# Patient Record
Sex: FEMALE | Race: WHITE | NOT HISPANIC OR LATINO | Employment: OTHER | URBAN - METROPOLITAN AREA
[De-identification: names, ages, dates, MRNs, and addresses within clinical notes are randomized per-mention and may not be internally consistent; named-entity substitution may affect disease eponyms.]

---

## 2019-06-05 ENCOUNTER — THERAPY VISIT (OUTPATIENT)
Dept: PHYSICAL THERAPY | Facility: CLINIC | Age: 74
End: 2019-06-05
Payer: COMMERCIAL

## 2019-06-05 DIAGNOSIS — M25.562 ACUTE PAIN OF LEFT KNEE: ICD-10-CM

## 2019-06-05 PROCEDURE — 97161 PT EVAL LOW COMPLEX 20 MIN: CPT | Mod: GP | Performed by: PHYSICAL THERAPIST

## 2019-06-05 PROCEDURE — 97110 THERAPEUTIC EXERCISES: CPT | Mod: GP | Performed by: PHYSICAL THERAPIST

## 2019-06-05 ASSESSMENT — ACTIVITIES OF DAILY LIVING (ADL)
SWELLING: THE SYMPTOM AFFECTS MY ACTIVITY MODERATELY
RISE FROM A CHAIR: ACTIVITY IS SOMEWHAT DIFFICULT
LIMPING: THE SYMPTOM AFFECTS MY ACTIVITY MODERATELY
KNEE_ACTIVITY_OF_DAILY_LIVING_SCORE: 38.57
GO UP STAIRS: ACTIVITY IS VERY DIFFICULT
SIT WITH YOUR KNEE BENT: ACTIVITY IS SOMEWHAT DIFFICULT
GIVING WAY, BUCKLING OR SHIFTING OF KNEE: I DO NOT HAVE THE SYMPTOM
HOW_WOULD_YOU_RATE_THE_CURRENT_FUNCTION_OF_YOUR_KNEE_DURING_YOUR_USUAL_DAILY_ACTIVITIES_ON_A_SCALE_FROM_0_TO_100_WITH_100_BEING_YOUR_LEVEL_OF_KNEE_FUNCTION_PRIOR_TO_YOUR_INJURY_AND_0_BEING_THE_INABILITY_TO_PERFORM_ANY_OF_YOUR_USUAL_DAILY_ACTIVITIES?: 30
STIFFNESS: THE SYMPTOM AFFECTS MY ACTIVITY SEVERELY
WEAKNESS: THE SYMPTOM AFFECTS MY ACTIVITY MODERATELY
PAIN: THE SYMPTOM AFFECTS MY ACTIVITY SEVERELY
AS_A_RESULT_OF_YOUR_KNEE_INJURY,_HOW_WOULD_YOU_RATE_YOUR_CURRENT_LEVEL_OF_DAILY_ACTIVITY?: ABNORMAL
RAW_SCORE: 27
SQUAT: ACTIVITY IS VERY DIFFICULT
STAND: ACTIVITY IS FAIRLY DIFFICULT
GO DOWN STAIRS: ACTIVITY IS VERY DIFFICULT
KNEE_ACTIVITY_OF_DAILY_LIVING_SUM: 27
KNEEL ON THE FRONT OF YOUR KNEE: ACTIVITY IS VERY DIFFICULT
WALK: ACTIVITY IS FAIRLY DIFFICULT
HOW_WOULD_YOU_RATE_THE_OVERALL_FUNCTION_OF_YOUR_KNEE_DURING_YOUR_USUAL_DAILY_ACTIVITIES?: ABNORMAL

## 2019-06-05 NOTE — LETTER
[facility]  [address]  [facility] [id number] [phone]    2019    Re: Dior Conde   :   1945  MRN:  [id number]   REFERRING PHYSICIAN:   Belgica Woodson    [facility]    Date of Initial Evaluation:  19  Visits:  Rxs Used: 1  Reason for Referral:  Acute pain of left knee    EVALUATION SUMMARY    [facility]  Subjective:  Pt with primary complaint of L knee pain. States the pain started on 19, sudden onset of pain, states the pain was severe for no apparent reason. 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 Pain is rated at 7/10 level. Pain is located around the patella, inferior, medial joint line and superior to the patella, severe burning sensation. Pt with referral for PT for 6 visits dated 19.     The history is provided by the patient. No  was used.   Dior Conde is a 74 year old female with a left knee condition.  Condition occurred with:  Insidious onset.  Condition occurred: at home and for unknown reasons.  This is a new condition     Patient reports pain:  Anterior, medial, sub patellar and in the joint.  Radiates to:  Knee.  Pain is described as burning and aching and is constant and reported as 7/10.  Associated symptoms:  Loss of motion/stiffness (feels swollen ). Pain is the same all the time.  Symptoms are exacerbated by ascending stairs, descending stairs, standing, walking and bending/squatting and relieved by ice and NSAID's.  Since onset symptoms are unchanged.  Special tests:  X-ray.      General health as reported by patient is fair.  Pertinent medical history includes:  Overweight, depression and emphysema.  Medical allergies: no.  Other surgeries include:  Other.  Current medications:  Anti-depressants (Inhaler).   Current occupation is Retired.    Employment tasks: Primary housekeeping tasks     Barriers include:  None as reported by the patient.    Red flags:  Pain at night/rest.            Objective:    Gait:    Gait Type:  Antalgic   Weight Bearing Status:  WBAT   Assistive Devices:  None        Knee Evaluation:  ROM:  Strength:  Not assessed  AROM  Extension:  Left: 10    Right:  0  Flexion: Left: 130    Right: 130  PROM  Hyperextension: Left: 0   Right:     Strength:   Extension:  Left: 5/5   Pain:      Right: 5/5   Pain:  Flexion:  Left: 5/5   Pain:      Right: 5/5   Pain:      Ligament Testing:  Ligament testing knee: Very painful testing MCL, no instability noted   Varus 0:  Left:  Neg     Varus 30:  Left:  Neg    Valgus 0:  Left:  Neg    Valgus 30:  Left:  Neg      Anterior Drawer:  Left:  Neg      Posterior Drawer: Left:  Neg      Special Tests:   Left knee positive for the following special tests:  Meniscal and Patellar Compression  Left knee negative for the following special tests:  Plica; Patellar Tracking-Abduction Medial; Patellar Tracking-Abduction Lateral; Hannah's and IT Band Friction    Palpation:    Left knee tenderness present at:  Medial Joint Line and Patellar Medial  Left knee tenderness not present at:  Lateral Joint Line; Patellar Tendon; IT Band; Popliteal; Biceps Femoral; Semitendinosus; Semembranosus; Gluteus Medius; Patellar Lateral; Patellar Superior and Patellar Inferior    Edema:  Normal    Mobility Testing:    Patellofemoral Medial:  Left: hypomobile      Patellofemoral Lateral:  Left: hypomobile      Patellofemoral Superior:  Left: hypomobile      Patellofemoral Inferior:  Left: hypomobile        Assessment/Plan:    Patient is a 74 year old female with left side knee complaints.    Patient has the following significant findings with corresponding treatment plan.                Diagnosis 1:  L knee pain   Pain -  hot/cold therapy, manual therapy, self management, education, directional  preference exercise and home program  Inflammation - cold therapy and self management/home program  Impaired gait - gait training and home program  Impaired muscle performance - neuro re-education and home program  Decreased function - therapeutic activities and home program    Therapy Evaluation Codes:   1) History comprised of:   Personal factors that impact the plan of care:      None.    Comorbidity factors that impact the plan of care are:      Depression, Emphysema and Overweight.     Medications impacting care: None.  2) Examination of Body Systems comprised of:   Body structures and functions that impact the plan of care:      Knee.   Activity limitations that impact the plan of care are:      Squatting/kneeling, Stairs, Standing, Walking and Sleeping.  3) Clinical presentation characteristics are:   Stable/Uncomplicated.  4) Decision-Making    Low complexity using standardized patient assessment instrument and/or measureable assessment of functional outcome.  Cumulative Therapy Evaluation is: Low complexity.    Previous and current functional limitations:  (See Goal Flow Sheet for this information)    Short term and Long term goals: (See Goal Flow Sheet for this information)     Communication ability:  Patient appears to be able to clearly communicate and understand verbal and written communication and follow directions correctly.  Treatment Explanation - The following has been discussed with the patient:   RX ordered/plan of care  Anticipated outcomes  Possible risks and side effects  This patient would benefit from PT intervention to resume normal activities.   Rehab potential is good.    Frequency:  1 X week, once daily  Duration:  for 6 weeks  Discharge Plan:  Achieve all LTG.  Independent in home treatment program.  Reach maximal therapeutic benefit.      Thank you for your referral.    INQUIRIES  Therapist: Noah Massey PT   [facility]  [address] [place] [address]  [address]  [facility] [address]  Phone: [phone]  Fax: [phone]

## 2019-06-05 NOTE — PROGRESS NOTES
[facility] Initial Evaluation  Subjective:  Pt with primary complaint of L knee pain. States the pain started on 5/11/19, sudden onset of pain, states the pain was severe for no apparent reason. 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 Pain is rated at 7/10 level. Pain is located around the patella, inferior, medial joint line and superior to the patella, severe burning sensation. Pt with referral for PT for 6 visits dated 5/13/19.     The history is provided by the patient. No  was used.   Dior Conde is a 74 year old female with a left knee condition.  Condition occurred with:  Insidious onset.  Condition occurred: at home and for unknown reasons.  This is a new condition     Patient reports pain:  Anterior, medial, sub patellar and in the joint.  Radiates to:  Knee.  Pain is described as burning and aching and is constant and reported as 7/10.  Associated symptoms:  Loss of motion/stiffness (feels swollen ). Pain is the same all the time.  Symptoms are exacerbated by ascending stairs, descending stairs, standing, walking and bending/squatting and relieved by ice and NSAID's.  Since onset symptoms are unchanged.  Special tests:  X-ray.      General health as reported by patient is fair.  Pertinent medical history includes:  Overweight, depression and emphysema.  Medical allergies: no.  Other surgeries include:  Other.  Current medications:  Anti-depressants (Inhaler).  Current occupation is Retired   .    Employment tasks: Primary housekeeping tasks     Barriers include:  None as reported by the patient.    Red flags:  Pain at night/rest.                        Objective:    Gait:    Gait Type:  Antalgic   Weight Bearing Status:  WBAT   Assistive Devices:  None                                                        Knee Evaluation:  ROM:  Strength:  Not assessed  AROM      Extension:  Left: 10    Right:   0  Flexion: Left: 130    Right: 130  PROM    Hyperextension: Left: 0   Right:           Strength:     Extension:  Left: 5/5   Pain:      Right: 5/5   Pain:  Flexion:  Left: 5/5   Pain:      Right: 5/5   Pain:        Ligament Testing:  Ligament testing knee: Very painful testing MCL, no instability noted   Varus 0:  Left:  Neg     Varus 30:  Left:  Neg    Valgus 0:  Left:  Neg    Valgus 30:  Left:  Neg      Anterior Drawer:  Left:  Neg      Posterior Drawer: Left:  Neg      Special Tests:   Left knee positive for the following special tests:  Meniscal and Patellar Compression  Left knee negative for the following special tests:  Plica; Patellar Tracking-Abduction Medial; Patellar Tracking-Abduction Lateral; Hannah's and IT Band Friction    Palpation:    Left knee tenderness present at:  Medial Joint Line and Patellar Medial  Left knee tenderness not present at:  Lateral Joint Line; Patellar Tendon; IT Band; Popliteal; Biceps Femoral; Semitendinosus; Semembranosus; Gluteus Medius; Patellar Lateral; Patellar Superior and Patellar Inferior    Edema:  Normal    Mobility Testing:      Patellofemoral Medial:  Left: hypomobile      Patellofemoral Lateral:  Left: hypomobile      Patellofemoral Superior:  Left: hypomobile      Patellofemoral Inferior:  Left: hypomobile            General     ROS    Assessment/Plan:    Patient is a 74 year old female with left side knee complaints.    Patient has the following significant findings with corresponding treatment plan.                Diagnosis 1:  L knee pain   Pain -  hot/cold therapy, manual therapy, self management, education, directional preference exercise and home program  Inflammation - cold therapy and self management/home program  Impaired gait - gait training and home program  Impaired muscle performance - neuro re-education and home program  Decreased function - therapeutic activities and home program    Therapy Evaluation Codes:   1) History comprised of:   Personal  factors that impact the plan of care:      None.    Comorbidity factors that impact the plan of care are:      Depression, Emphysema and Overweight.     Medications impacting care: None.  2) Examination of Body Systems comprised of:   Body structures and functions that impact the plan of care:      Knee.   Activity limitations that impact the plan of care are:      Squatting/kneeling, Stairs, Standing, Walking and Sleeping.  3) Clinical presentation characteristics are:   Stable/Uncomplicated.  4) Decision-Making    Low complexity using standardized patient assessment instrument and/or measureable assessment of functional outcome.  Cumulative Therapy Evaluation is: Low complexity.    Previous and current functional limitations:  (See Goal Flow Sheet for this information)    Short term and Long term goals: (See Goal Flow Sheet for this information)     Communication ability:  Patient appears to be able to clearly communicate and understand verbal and written communication and follow directions correctly.  Treatment Explanation - The following has been discussed with the patient:   RX ordered/plan of care  Anticipated outcomes  Possible risks and side effects  This patient would benefit from PT intervention to resume normal activities.   Rehab potential is good.    Frequency:  1 X week, once daily  Duration:  for 6 weeks  Discharge Plan:  Achieve all LTG.  Independent in home treatment program.  Reach maximal therapeutic benefit.    Please refer to the daily flowsheet for treatment today, total treatment time and time spent performing 1:1 timed codes.

## 2019-10-17 PROBLEM — M25.562 ACUTE PAIN OF LEFT KNEE: Status: RESOLVED | Noted: 2019-06-05 | Resolved: 2019-10-17

## 2020-01-02 ENCOUNTER — ANCILLARY PROCEDURE (OUTPATIENT)
Dept: GENERAL RADIOLOGY | Facility: CLINIC | Age: 75
End: 2020-01-02
Attending: NURSE PRACTITIONER
Payer: COMMERCIAL

## 2020-01-02 ENCOUNTER — OFFICE VISIT (OUTPATIENT)
Dept: URGENT CARE | Facility: URGENT CARE | Age: 75
End: 2020-01-02
Payer: COMMERCIAL

## 2020-01-02 VITALS
TEMPERATURE: 97.6 F | HEART RATE: 76 BPM | OXYGEN SATURATION: 94 % | DIASTOLIC BLOOD PRESSURE: 76 MMHG | SYSTOLIC BLOOD PRESSURE: 137 MMHG

## 2020-01-02 DIAGNOSIS — B37.0 ORAL THRUSH: ICD-10-CM

## 2020-01-02 DIAGNOSIS — J20.9 ACUTE BRONCHITIS WITH SYMPTOMS > 10 DAYS: ICD-10-CM

## 2020-01-02 DIAGNOSIS — R09.89 CHEST CONGESTION: ICD-10-CM

## 2020-01-02 DIAGNOSIS — R09.89 CHEST CONGESTION: Primary | ICD-10-CM

## 2020-01-02 PROBLEM — K57.30 DIVERTICULOSIS OF LARGE INTESTINE WITHOUT HEMORRHAGE: Status: ACTIVE | Noted: 2018-12-26

## 2020-01-02 PROBLEM — G44.309 POST-TRAUMATIC HEADACHE: Status: ACTIVE | Noted: 2018-03-18

## 2020-01-02 LAB
BASOPHILS # BLD AUTO: 0 10E9/L (ref 0–0.2)
BASOPHILS NFR BLD AUTO: 0.3 %
DIFFERENTIAL METHOD BLD: ABNORMAL
EOSINOPHIL # BLD AUTO: 0.2 10E9/L (ref 0–0.7)
EOSINOPHIL NFR BLD AUTO: 1.6 %
ERYTHROCYTE [DISTWIDTH] IN BLOOD BY AUTOMATED COUNT: 12.4 % (ref 10–15)
HCT VFR BLD AUTO: 39.4 % (ref 35–47)
HGB BLD-MCNC: 12.9 G/DL (ref 11.7–15.7)
LYMPHOCYTES # BLD AUTO: 3.1 10E9/L (ref 0.8–5.3)
LYMPHOCYTES NFR BLD AUTO: 27.7 %
MCH RBC QN AUTO: 31.2 PG (ref 26.5–33)
MCHC RBC AUTO-ENTMCNC: 32.7 G/DL (ref 31.5–36.5)
MCV RBC AUTO: 95 FL (ref 78–100)
MONOCYTES # BLD AUTO: 0.8 10E9/L (ref 0–1.3)
MONOCYTES NFR BLD AUTO: 6.9 %
NEUTROPHILS # BLD AUTO: 7.1 10E9/L (ref 1.6–8.3)
NEUTROPHILS NFR BLD AUTO: 63.5 %
PLATELET # BLD AUTO: 258 10E9/L (ref 150–450)
RBC # BLD AUTO: 4.13 10E12/L (ref 3.8–5.2)
WBC # BLD AUTO: 11.2 10E9/L (ref 4–11)

## 2020-01-02 PROCEDURE — 71046 X-RAY EXAM CHEST 2 VIEWS: CPT

## 2020-01-02 PROCEDURE — 85025 COMPLETE CBC W/AUTO DIFF WBC: CPT | Performed by: NURSE PRACTITIONER

## 2020-01-02 PROCEDURE — 99203 OFFICE O/P NEW LOW 30 MIN: CPT | Mod: 25 | Performed by: NURSE PRACTITIONER

## 2020-01-02 PROCEDURE — 94640 AIRWAY INHALATION TREATMENT: CPT | Performed by: NURSE PRACTITIONER

## 2020-01-02 PROCEDURE — 36415 COLL VENOUS BLD VENIPUNCTURE: CPT | Performed by: NURSE PRACTITIONER

## 2020-01-02 RX ORDER — PREDNISONE 20 MG/1
40 TABLET ORAL DAILY
Qty: 10 TABLET | Refills: 0 | Status: SHIPPED | OUTPATIENT
Start: 2020-01-02 | End: 2020-01-07

## 2020-01-02 RX ORDER — CLOTRIMAZOLE 10 MG/1
10 LOZENGE ORAL
Qty: 30 TROCHE | Refills: 0 | Status: SHIPPED | OUTPATIENT
Start: 2020-01-02 | End: 2022-09-14

## 2020-01-02 RX ORDER — ALBUTEROL SULFATE 0.83 MG/ML
2.5 SOLUTION RESPIRATORY (INHALATION) ONCE
Status: COMPLETED | OUTPATIENT
Start: 2020-01-02 | End: 2020-01-02

## 2020-01-02 RX ORDER — ALBUTEROL SULFATE 0.83 MG/ML
2.5 SOLUTION RESPIRATORY (INHALATION) EVERY 6 HOURS PRN
Qty: 1 BOX | Refills: 0 | Status: SHIPPED | OUTPATIENT
Start: 2020-01-02 | End: 2022-09-14

## 2020-01-02 RX ORDER — AZITHROMYCIN 250 MG/1
TABLET, FILM COATED ORAL
Qty: 6 TABLET | Refills: 0 | Status: SHIPPED | OUTPATIENT
Start: 2020-01-02 | End: 2020-01-02

## 2020-01-02 RX ORDER — AZITHROMYCIN 250 MG/1
TABLET, FILM COATED ORAL
Qty: 6 TABLET | Refills: 0 | Status: SHIPPED | OUTPATIENT
Start: 2020-01-02 | End: 2020-01-07

## 2020-01-02 RX ADMIN — ALBUTEROL SULFATE 2.5 MG: 0.83 SOLUTION RESPIRATORY (INHALATION) at 20:08

## 2020-01-02 ASSESSMENT — ENCOUNTER SYMPTOMS
MYALGIAS: 1
WHEEZING: 1
HEADACHES: 1
CARDIOVASCULAR NEGATIVE: 1
SPUTUM PRODUCTION: 1
COUGH: 1
VOMITING: 0
NAUSEA: 1
SINUS PAIN: 1
SHORTNESS OF BREATH: 1
CHILLS: 1

## 2020-01-03 NOTE — PROGRESS NOTES
HPI  Dior Conde 74 year old presents to the urgent care with chief complaint of cough and congestion.  It is noted that she has a history of COPD.  She is here with her daughter who is a registered nurse and is very concerned about her mother's weakness, shortness of breath and wheezing.  Patient states she has not been febrile but extremely fatigued and short of breath.  She is on numerous medications including inhalers for her COPD.  She states cough is productive and frequent.  She believes that she initially had an influenza type illness at [facility] where she ran a fever for approximately 3 days.  Those symptoms have resolved.    Review of Systems   Constitutional: Positive for chills and malaise/fatigue.   HENT: Positive for congestion and sinus pain.    Respiratory: Positive for cough, sputum production, shortness of breath and wheezing.    Cardiovascular: Negative.    Gastrointestinal: Positive for nausea. Negative for vomiting.   Genitourinary: Negative.    Musculoskeletal: Positive for myalgias.   Neurological: Positive for headaches.     Past Medical History:   Diagnosis Date     Bleeding ulcer      Breast cyst     13 years old     Patient Active Problem List   Diagnosis     Depression with anxiety     Diverticulosis of large intestine without hemorrhage     Examination of participant in clinical trial     Hypercholesteremia     Post-traumatic headache     Pulmonary emphysema, unspecified emphysema type (H)     Vitamin D deficiency     Hx of colonic polyps       Past Surgical History:   Procedure Laterality Date     HERNIA REPAIR       Allergies   Allergen Reactions     Nkda [No Known Drug Allergies]      Current Outpatient Medications   Medication     albuterol (PROVENTIL) (2.5 MG/3ML) 0.083% neb solution     azithromycin (ZITHROMAX) 250 MG tablet     BuPROPion HCl (WELLBUTRIN PO)     Cephalexin (KEFLEX PO)     CITALOPRAM HYDROBROMIDE PO     clotrimazole 10 MG froilan     predniSONE  Zoloft   I suggest monitoring the sodium as SIADH from Zoloft   use and hypersecretion of ADH associated with surgery can reduce sodium in the perioperative period   (DELTASONE) 20 MG tablet     No current facility-administered medications for this visit.          Physical Exam  Vitals signs and nursing note reviewed.   Constitutional:       General: She is not in acute distress.     Appearance: She is ill-appearing.      Comments: /76   Pulse 76   Temp 97.6  F (36.4  C) (Tympanic)   SpO2 94%      HENT:      Head: Normocephalic.      Right Ear: Tympanic membrane normal.      Nose: Congestion and rhinorrhea present.      Mouth/Throat:      Mouth: Mucous membranes are moist.   Eyes:      Conjunctiva/sclera: Conjunctivae normal.   Cardiovascular:      Rate and Rhythm: Normal rate.      Heart sounds: Normal heart sounds.   Pulmonary:      Effort: Pulmonary effort is normal.      Breath sounds: Wheezing and rhonchi present.      Comments:  air movement with neb but continues to have expiratory wheezing bilaterally  Abdominal:      Tenderness: There is no abdominal tenderness.   Lymphadenopathy:      Cervical: No cervical adenopathy.   Skin:     General: Skin is warm and dry.   Neurological:      Mental Status: She is alert and oriented to person, place, and time.     CHEST TWO VIEWS 1/2/2020 6:55 PM   HISTORY: Chest congestion.   COMPARISON: None.   IMPRESSION: PA and lateral views of the chest. Linear atelectasis or   scarring is noted at the left lung base. Lungs are otherwise clear.   Heart is normal in size. No effusions are evident. No pneumothorax.   AMRIT CHRISTIANSON MD  Assessment:  1. Chest congestion    2. Acute bronchitis with symptoms > 10 days    3. Oral thrush        Plan:  Orders Placed This Encounter     INHALATION/NEBULIZER TREATMENT, INITIAL     XR Chest 2 Views     CBC with platelets and differential     DISCONTD: azithromycin (ZITHROMAX) 250 MG tablet     albuterol (PROVENTIL) neb solution 2.5 mg     albuterol (PROVENTIL) (2.5 MG/3ML) 0.083% neb solution     predniSONE (DELTASONE) 20 MG tablet     azithromycin (ZITHROMAX) 250 MG tablet     clotrimazole 10  MG froilan   Tylenol 1-2 tabs po q4h prn pain/fever  Recheck with PCP in 3-5 days  Instructions regarding self-care of patient with URI/COPD exacerbation reviewed.   Written instructions provided in after visit summary and reviewed.  Patient instructed to see primary care provider for new or persistent symptoms.   Red flag symptoms reviewed and patient has been instructed to seek emergent care  Please contact pharmacy for medication questions.  Patient instructed to take medications as directed on package.      Chey Govea, DNP, APRN, CNP

## 2020-01-03 NOTE — PATIENT INSTRUCTIONS

## 2020-09-25 ENCOUNTER — OFFICE VISIT (OUTPATIENT)
Dept: PEDIATRICS | Facility: CLINIC | Age: 75
End: 2020-09-25
Payer: COMMERCIAL

## 2020-09-25 ENCOUNTER — ANCILLARY PROCEDURE (OUTPATIENT)
Dept: GENERAL RADIOLOGY | Facility: CLINIC | Age: 75
End: 2020-09-25
Attending: NURSE PRACTITIONER
Payer: COMMERCIAL

## 2020-09-25 VITALS
SYSTOLIC BLOOD PRESSURE: 129 MMHG | WEIGHT: 163.1 LBS | HEART RATE: 66 BPM | OXYGEN SATURATION: 96 % | DIASTOLIC BLOOD PRESSURE: 74 MMHG | TEMPERATURE: 98.3 F

## 2020-09-25 DIAGNOSIS — R06.02 SHORTNESS OF BREATH: ICD-10-CM

## 2020-09-25 DIAGNOSIS — J43.9 PULMONARY EMPHYSEMA, UNSPECIFIED EMPHYSEMA TYPE (H): ICD-10-CM

## 2020-09-25 DIAGNOSIS — M25.511 RIGHT SHOULDER PAIN, UNSPECIFIED CHRONICITY: Primary | ICD-10-CM

## 2020-09-25 DIAGNOSIS — M25.511 RIGHT SHOULDER PAIN, UNSPECIFIED CHRONICITY: ICD-10-CM

## 2020-09-25 DIAGNOSIS — R73.09 ELEVATED GLUCOSE: ICD-10-CM

## 2020-09-25 DIAGNOSIS — R35.0 URINE FREQUENCY: ICD-10-CM

## 2020-09-25 PROCEDURE — 71046 X-RAY EXAM CHEST 2 VIEWS: CPT | Performed by: RADIOLOGY

## 2020-09-25 PROCEDURE — 99214 OFFICE O/P EST MOD 30 MIN: CPT | Performed by: NURSE PRACTITIONER

## 2020-09-25 PROCEDURE — 93000 ELECTROCARDIOGRAM COMPLETE: CPT | Performed by: NURSE PRACTITIONER

## 2020-09-25 PROCEDURE — 73030 X-RAY EXAM OF SHOULDER: CPT | Mod: RT | Performed by: RADIOLOGY

## 2020-09-25 NOTE — PROGRESS NOTES
Subjective     Dior Conde is a 75 year old female who presents to clinic today for the following health issues:    HPI     Musculoskeletal problem/pain  Onset/Duration: 4-5 days ago  Description  Location: Shoulder - right  Joint Swelling: no  Redness: no  Pain: YES  Warmth: no  Intensity:  moderate  Progression of Symptoms:  improving  Accompanying signs and symptoms:   Fevers: no  Numbness/tingling/weakness: no  History  Trauma to the area: no  Recent illness:  no  Previous similar problem: no  Previous evaluation:  no  Precipitating or alleviating factors:  Aggravating factors include: none  Therapies tried and outcome: nothing and Ibuprofen  No fall or injury     Also, she has additional complaints of :  COPD Follow-Up    Overall, how are your COPD symptoms since your last clinic visit?   Been getting worse since August when had gallbladder removed     How much fatigue or shortness of breath do you have when you are walking?  More than usual    How much shortness of breath do you have when you are resting?  More than usual    How often do you cough? Sometimes    Have you noticed any change in your sputum/phlegm?  No    Have you experienced a recent fever? No    Please describe how far you can walk without stopping to rest:  5 to 6 blocks     How many flights of stairs are you able to walk up without stopping?  1 but getting more short of breath     Have you had any Emergency Room Visits, Urgent Care Visits, or Hospital Admissions because of your COPD since your last office visit?  No    History   Smoking Status     Current Every Day Smoker   Smokeless Tobacco     Never Used     Comment: 1-2 cigarettes daily     Lab work is in process  Labs reviewed in EPIC  BP Readings from Last 3 Encounters:   09/25/20 129/74   01/02/20 137/76   09/30/12 124/72    Wt Readings from Last 3 Encounters:   09/25/20 74 kg (163 lb 1.6 oz)   09/30/12 82.1 kg (181 lb)                  Patient Active Problem List   Diagnosis      Depression with anxiety     Diverticulosis of large intestine without hemorrhage     Examination of participant in clinical trial     Hypercholesteremia     Post-traumatic headache     Pulmonary emphysema, unspecified emphysema type (H)     Vitamin D deficiency     Hx of colonic polyps     Past Surgical History:   Procedure Laterality Date     HERNIA REPAIR         Social History     Tobacco Use     Smoking status: Former Smoker     Packs/day: 1.00     Years: 40.00     Pack years: 40.00     Types: Cigarettes     Last attempt to quit: 2000     Years since quittin.0     Smokeless tobacco: Never Used   Substance Use Topics     Alcohol use: No     History reviewed. No pertinent family history.      Current Outpatient Medications   Medication Sig Dispense Refill     albuterol (PROVENTIL) (2.5 MG/3ML) 0.083% neb solution Take 1 vial (2.5 mg) by nebulization every 6 hours as needed for shortness of breath / dyspnea or wheezing 1 Box 0     BuPROPion HCl (WELLBUTRIN PO) Take  by mouth.         Cephalexin (KEFLEX PO) Take 1 capsule by mouth.         CITALOPRAM HYDROBROMIDE PO Take 10 mg by mouth.         clotrimazole 10 MG mike Take 1 Mike (10 mg) by mouth 5 times daily 30 Mike 0     diclofenac (VOLTAREN) 1 % topical gel Place 2 g onto the skin 4 times daily 100 g 1     Allergies   Allergen Reactions     Nkda [No Known Drug Allergies]              No results found for: FEV1, RCK1DIA    Review of Systems   CONSTITUTIONAL:NEGATIVE for fever, chills, change in weight  ENT/MOUTH: NEGATIVE for ear, mouth and throat problems  RESP:POSITIVE for dyspnea on exertion, Hx COPD and wheezing and NEGATIVE for cough-productive and hemoptysis  CV: POSITIVE for dyspnea on exertion and NEGATIVE for cyanosis, diaphoresis, irregular heart beat, lower extremity edema and palpitations  GI: NEGATIVE for jaundice, melena, nausea, poor appetite, vomiting and weight loss  : POSITIVE for frequency and NEGATIVE for hematuria, hesitancy  and Hx kidney stone    MUSCULOSKELETAL: POSITIVE  for joint pain right shoulder  and NEGATIVE for joint swelling  and joint warmth   NEURO: NEGATIVE for weakness, dizziness or paresthesias  ENDOCRINE: NEGATIVE for temperature intolerance, skin/hair changes  HEME/ALLERGY/IMMUNE: NEGATIVE for bleeding problems        Objective    /74 (BP Location: Right arm, Patient Position: Sitting, Cuff Size: Adult Regular)   Pulse 66   Temp 98.3  F (36.8  C) (Temporal)   Wt 74 kg (163 lb 1.6 oz)   SpO2 96%   There is no height or weight on file to calculate BMI.  Physical Exam   GENERAL APPEARANCE: alert, active and no distress  bilateral TM normal without fluid or infection, neck has neither anterior cervical nodes enlarged and throat normal without erythema or exudate  RESP: lungs clear to auscultation - no rales, rhonchi or wheezes  CV: regular rates and rhythm, no murmur, click or rub and no irregular beats  MS: extremities normal- no gross deformities noted  ORTHO:   SHOULDER Exam-Right   Inspection: no swelling, no bruising, no discoloration, no obvious deformity, no asymmetry, no glenohumeral joint anterior bulge, no distal clavicle elevation, no muscle atrophy, no scapular winging   Tenderness of: SC joint- no , clavicle(prox-mid)- no , clavicle-(mid-distal)- no , AC joint- YES, acromion- no , anterior capsule- no , prox bicep tendon- no , greater tuberosity- no , prox humerus- no , supraspinatous- no , infraspinatous- no , superior trapezious- no , rhomboids- no    Range of Motion: Active- limited due to pain.  Range of Motion: Passive- limited due to pain.   Strength: forward flexion- 5/5, abduction- 5/5, internal rotation- 5/5, external rotation- 5/5 and bicep- full        SKIN: no suspicious lesions or rashes  NEURO: Normal strength and tone, mentation intact and speech normal  PSYCH: mentation appears normal and affect normal/bright  MENTAL STATUS EXAM:  Appearance/Behavior: No apparent distress, Casually  groomed and Dressed appropriately for weather  Speech: Normal  Mood/Affect: normal affect  Insight: Adequate      EKG - appears normal, NSR, normal axis, normal intervals, no acute ST/T changes c/w ischemia, no LVH by voltage criteria  Results for orders placed or performed in visit on 09/25/20   XR Shoulder Right 2 Views     Status: None    Narrative    2 views right shoulder radiographs 9/25/2020 6:22 PM    History: Right shoulder pain, unspecified chronicity    Comparison: 1/2/2020    Findings:    AP, transscapular Y views of the right shoulder were obtained.     No acute osseous abnormality. Glenohumeral and acromioclavicular  joints are congruent.    Moderate degenerative changes of the acromioclavicular joint. Mild  degenerative change of the glenohumeral joint. Enthesopathic change of  greater tuberosity. Bones appear osteopenic.    Degenerative changes of visualized spine.    Soft tissue is unremarkable. Right infrahilar bandlike opacity,  similar to prior, likely scar. Atherosclerotic calcification of aorta.      Impression    Impression:  1. No acute osseous abnormality.  2. Moderate acromioclavicular joint degenerative change.    LITZY BALTAZAR   Results for orders placed or performed in visit on 09/25/20   XR Chest 2 Views     Status: None    Narrative    EXAM: XR CHEST 2 VW  9/25/2020 5:25 PM     HISTORY:  Shortness of breath       COMPARISON:  Chest x-ray 1/2/2020.    FINDINGS: PA and lateral radiographs of the chest. High lung volumes  with flattening of the hemidiaphragms. Bibasilar streaky opacities.  Arthroscopic ossifications of the aortic arch. The mediastinal border,  cardiac silhouette, and pulmonary vasculature are within normal  limits. No focal airspace opacities. No pneumothorax. No pleural  effusions. Degenerative changes to the spine. Cholecystectomy clips.      Impression    IMPRESSION:  1. No focal airspace opacity.  2. High lung volumes with flattening of the hemidiaphragms,  this  finding is nonspecific but can be seen in patients with chronic  obstructive pulmonary disease.  3. Streaky bibasilar opacities likely representing atelectasis versus  scarring.    I have personally reviewed the examination and initial interpretation  and I agree with the findings.    CATY NASH MD             Assessment & Plan     Dior was seen today for musculoskeletal problem.    Diagnoses and all orders for this visit:    Right shoulder pain, unspecified chronicity  -     XR Shoulder Right 2 Views; Future  -     Orthopedic & Spine  Referral; Future  -     diclofenac (VOLTAREN) 1 % topical gel; Place 2 g onto the skin 4 times daily    Shortness of breath  -     XR Chest 2 Views; Future  -     EKG 12-lead complete w/read - Clinics  -     Cancel: CBC with platelets  -     Cancel: Comprehensive metabolic panel  -     CBC with platelets; Future  -     Comprehensive metabolic panel (BMP + Alb, Alk Phos, ALT, AST, Total. Bili, TP); Future    Pulmonary emphysema, unspecified emphysema type (H)  Smoking cessation strongly encouraged  Medications:  Continue current medication regimen unchanged.    Urine frequency  -     Cancel: UA with Microscopic reflex to Culture  -     UA with Microscopic reflex to Culture ([place]; [facility]); Future  Will follow up and/or notify patient of  results via My Chart to determine further need for followup      Elevated glucose  -     Cancel: Hemoglobin A1c  -     Hemoglobin A1c; Future  Will follow up and/or notify patient of  results via My Chart to determine further need for followup      Tobacco Cessation:   reports that she has been smoking. She has never used smokeless tobacco.    See Patient Instructions  Patient Instructions     PLAN:   1.   Symptomatic therapy suggested: will try arthritis gel for shoulder     2.  Orders Placed This Encounter   Medications     diclofenac (VOLTAREN) 1 % topical gel     Sig: Place 2 g onto the skin 4 times daily      Dispense:  100 g     Refill:  1     Orders Placed This Encounter   Procedures     XR Shoulder Right 2 Views     XR Chest 2 Views     CBC with platelets     UA with Microscopic reflex to Culture     Comprehensive metabolic panel     Hemoglobin A1c     Orthopedic & Spine  Referral     EKG 12-lead complete w/read - Clinics     3. Patient needs to follow up in if no improvement,or sooner if worsening of symptoms or other symptoms develop.  FURTHER TESTING:       - chest xray and right shoulder xray   Will follow up and/or notify patient of  results via My Chart to determine further need for followup      No follow-ups on file.    Myra Quijano, APRN CNP  [facility]

## 2020-09-25 NOTE — PATIENT INSTRUCTIONS
PLAN:   1.   Symptomatic therapy suggested: will try arthritis gel for shoulder     2.  Orders Placed This Encounter   Medications     diclofenac (VOLTAREN) 1 % topical gel     Sig: Place 2 g onto the skin 4 times daily     Dispense:  100 g     Refill:  1     Orders Placed This Encounter   Procedures     XR Shoulder Right 2 Views     XR Chest 2 Views     CBC with platelets     UA with Microscopic reflex to Culture     Comprehensive metabolic panel     Hemoglobin A1c     Orthopedic & Spine  Referral     EKG 12-lead complete w/read - Clinics     3. Patient needs to follow up in if no improvement,or sooner if worsening of symptoms or other symptoms develop.  FURTHER TESTING:       - chest xray and right shoulder xray   Will follow up and/or notify patient of  results via My Chart to determine further need for followup

## 2020-09-27 NOTE — RESULT ENCOUNTER NOTE
Damon Conde,    Attached are your test results.  As we discussed looks like arthritis   Please keep appointment with orthopedics as planned    Please contact us if you have any questions.    Myra Quijano, CNP

## 2020-09-27 NOTE — RESULT ENCOUNTER NOTE
Damon Conde,    Attached are your test results.  Chest xray consistent with history of COPD   No pneumonia noted which is good news    Please contact us if you have any questions.    Myra Quijano, CNP

## 2020-09-30 ENCOUNTER — TELEPHONE (OUTPATIENT)
Dept: ORTHOPEDICS | Facility: CLINIC | Age: 75
End: 2020-09-30

## 2020-09-30 NOTE — TELEPHONE ENCOUNTER
9/30 Explained we need to reschedule appointment on 10/7 from 1:00 to 1:20.     Mary Jo   Procedure    Ortho/Sports Med/Pod/Ent/Eye/Surgical Specialties  [facility] [place]   [phone]

## 2020-10-06 NOTE — PROGRESS NOTES
[facility]  10/6/2020    PCP: Tyler Brothers    Referring Provider:  Myra Quijano, APRN CNP  [id number]  [place] [address]    Chief Complaint   Patient presents with     Right Shoulder - Pain     Description     Pt states that she was told by another provider that she has arthritis in R shoulder.        BP: 123/72 Pulse: 69   Resp: 20 SpO2: 100 %        Reason for visit:     What part of your body is injured / painful?  R shoulder    What caused the injury /pain? No inciting event     How long ago did your injury occur or pain begin? several weeks ago    What are your most bothersome symptoms? Pain    How would you characterize your symptom?  throbing    What makes your symptoms better? Other: voltaren gel    What makes your symptoms worse? Nothing    Have you been previously seen for this problem? No    Medical History:    Any recent changes to your medical history? No    Any new medication prescribed since last visit? No    Have you had surgery on this body part before? No    Social History:    Occupation: Retired     Handedness: Right    Exercise: Pt walks dog about 6 blocks daily.     Review of Systems:    Do you have fever, chills, weight loss? No    Do you have any vision problems? No    Do you have any chest pain or edema? No    Do you have any shortness of breath or wheezing?  No    Do you have stomach problems? No    Do you have any urinary track issues? No    Do you have any numbness or focal weakness? No    Do you have diabetes? No    Do you have problems with bleeding or clotting? No    Do you have an rashes or other skin lesions? No       CHIEF COMPLAINT:  Pain of the Right Shoulder and Description (Pt states that she was told by another provider that she has arthritis in R shoulder. )       HISTORY OF PRESENT ILLNESS  Ms. Conde is a pleasant 75 year old female who presents to clinic today with right shoulder pain.  Dior has had right shoulder  pain for the past month or so.  No clear inciting she can recall.  She points to the top of her shoulder, pain is worse whenever she reaches her arm in front of her, or when she holds objects out at length.  She was seen by her primary care office and diagnosed with arthritis of the AC joint, she has been using Voltaren gel which has helped her quite a bit.  Currently she is free.        Additional history: as documented    MEDICAL HISTORY  Patient Active Problem List   Diagnosis     Depression with anxiety     Diverticulosis of large intestine without hemorrhage     Examination of participant in clinical trial     Hypercholesteremia     Post-traumatic headache     Pulmonary emphysema, unspecified emphysema type (H)     Vitamin D deficiency     Hx of colonic polyps       Current Outpatient Medications   Medication Sig Dispense Refill     albuterol (PROVENTIL) (2.5 MG/3ML) 0.083% neb solution Take 1 vial (2.5 mg) by nebulization every 6 hours as needed for shortness of breath / dyspnea or wheezing 1 Box 0     aspirin (ASA) 81 MG EC tablet        atorvastatin (LIPITOR) 20 MG tablet        BuPROPion HCl (WELLBUTRIN PO) Take  by mouth.         CITALOPRAM HYDROBROMIDE PO Take 10 mg by mouth.         diclofenac (VOLTAREN) 1 % topical gel Place 2 g onto the skin 4 times daily 100 g 1     omeprazole (PRILOSEC OTC) 20 MG EC tablet Take 20 mg by mouth       umeclidinium-vilanterol (ANORO ELLIPTA) 62.5-25 MCG/INH oral inhaler        Cephalexin (KEFLEX PO) Take 1 capsule by mouth.         clotrimazole 10 MG mike Take 1 Mike (10 mg) by mouth 5 times daily (Patient not taking: Reported on 10/7/2020) 30 Mike 0       Allergies   Allergen Reactions     Nkda [No Known Drug Allergies]        No family history on file.    Additional medical/Social/Surgical histories reviewed in [facility] and updated as appropriate.        PHYSICAL EXAM    Vitals:    10/07/20 1334   BP: 123/72   BP Location: Right arm   Patient Position: Sitting   Cuff  "Size: Adult Regular   Pulse: 69   Resp: 20   SpO2: 100%   Weight: 73.9 kg (163 lb)   Height: 1.549 m (5' 1\")     General  - normal appearance, in no obvious distress  HEENT  - conjunctivae not injected, moist mucous membranes  CV  - normal radial pulse  Pulm  - normal respiratory pattern, non-labored  Musculoskeletal - right shoulder  - inspection: normal bone and joint alignment, no obvious deformity, no scapular winging, no AC step-off  - palpation: no bony or soft tissue tenderness, normal clavicle, non-tender AC  - ROM:  160 deg flexion   150 deg abduction  - strength: 5/5  strength, 5/5 in all shoulder planes  Neuro  - no sensory or motor deficit, grossly normal coordination, normal muscle tone  Skin  - no ecchymosis, erythema, warmth, or induration, no obvious rash  Psych  - interactive, appropriate, normal mood and affect         ASSESSMENT & PLAN  Ms. Conde is a 75 year old female who presents to clinic today with right shoulder pain.    I reviewed her x-ray in the room with her which does show severe AC osteoarthritis.    We had a good discussion centering around arthritis of the AC joint and arthritis in general.  We discussed pathophysiology and treatment strategies including watchful waiting, topical treatments, oral analgesics, avoiding exacerbating activities, and the role of corticosteroid injections.    I am happy that she is doing better, she does know that a corticosteroid injection may be something we could do in the future, if need be.  She should continue to use topical treatment with Voltaren gel, as this is helping.    If she continues to do well we can follow-up as needed for this and other issues.    It was a pleasure seeing Dior today.    Jamal Quijano DO, [facility]  Primary Care Sports Medicine      This note was constructed using Dragon dictation software, please excuse any minor errors in spelling, grammar, or syntax.    "

## 2020-10-07 ENCOUNTER — OFFICE VISIT (OUTPATIENT)
Dept: ORTHOPEDICS | Facility: CLINIC | Age: 75
End: 2020-10-07
Attending: NURSE PRACTITIONER
Payer: COMMERCIAL

## 2020-10-07 VITALS
WEIGHT: 163 LBS | DIASTOLIC BLOOD PRESSURE: 72 MMHG | HEIGHT: 61 IN | OXYGEN SATURATION: 100 % | RESPIRATION RATE: 20 BRPM | BODY MASS INDEX: 30.78 KG/M2 | SYSTOLIC BLOOD PRESSURE: 123 MMHG | HEART RATE: 69 BPM

## 2020-10-07 DIAGNOSIS — M25.511 CHRONIC RIGHT SHOULDER PAIN: ICD-10-CM

## 2020-10-07 DIAGNOSIS — M19.019 OSTEOARTHRITIS OF AC (ACROMIOCLAVICULAR) JOINT: Primary | ICD-10-CM

## 2020-10-07 DIAGNOSIS — G89.29 CHRONIC RIGHT SHOULDER PAIN: ICD-10-CM

## 2020-10-07 PROCEDURE — 99213 OFFICE O/P EST LOW 20 MIN: CPT | Performed by: FAMILY MEDICINE

## 2020-10-07 RX ORDER — OMEPRAZOLE 20 MG/1
20 TABLET, DELAYED RELEASE ORAL
COMMUNITY
Start: 2020-03-30 | End: 2022-03-10

## 2020-10-07 RX ORDER — ATORVASTATIN CALCIUM 20 MG/1
TABLET, FILM COATED ORAL
COMMUNITY
Start: 2020-07-29

## 2020-10-07 ASSESSMENT — PAIN SCALES - GENERAL: PAINLEVEL: NO PAIN (1)

## 2020-10-07 ASSESSMENT — MIFFLIN-ST. JEOR: SCORE: 1166.74

## 2020-10-07 NOTE — LETTER
10/7/2020         RE: Dior Conde  [address]  [facility] [address]        Dear Colleague,    Thank you for referring your patient, Dior Conde, to the [facility] [place]. Please see a copy of my visit note below.          [facility]  10/6/2020    PCP: Tyler Brothesr    Referring Provider:  Myra Quijano, APRN CNP  [id number]  [place] [address]    Chief Complaint   Patient presents with     Right Shoulder - Pain     Description     Pt states that she was told by another provider that she has arthritis in R shoulder.        BP: 123/72 Pulse: 69   Resp: 20 SpO2: 100 %        Reason for visit:     What part of your body is injured / painful?  R shoulder    What caused the injury /pain? No inciting event     How long ago did your injury occur or pain begin? several weeks ago    What are your most bothersome symptoms? Pain    How would you characterize your symptom?  throbing    What makes your symptoms better? Other: voltaren gel    What makes your symptoms worse? Nothing    Have you been previously seen for this problem? No    Medical History:    Any recent changes to your medical history? No    Any new medication prescribed since last visit? No    Have you had surgery on this body part before? No    Social History:    Occupation: Retired     Handedness: Right    Exercise: Pt walks dog about 6 blocks daily.     Review of Systems:    Do you have fever, chills, weight loss? No    Do you have any vision problems? No    Do you have any chest pain or edema? No    Do you have any shortness of breath or wheezing?  No    Do you have stomach problems? No    Do you have any urinary track issues? No    Do you have any numbness or focal weakness? No    Do you have diabetes? No    Do you have problems with bleeding or clotting? No    Do you have an rashes or other skin lesions? No       CHIEF COMPLAINT:  Pain of the Right Shoulder and  Description (Pt states that she was told by another provider that she has arthritis in R shoulder. )       HISTORY OF PRESENT ILLNESS  Ms. Conde is a pleasant 75 year old female who presents to clinic today with right shoulder pain.  Dior has had right shoulder pain for the past month or so.  No clear inciting she can recall.  She points to the top of her shoulder, pain is worse whenever she reaches her arm in front of her, or when she holds objects out at length.  She was seen by her primary care office and diagnosed with arthritis of the AC joint, she has been using Voltaren gel which has helped her quite a bit.  Currently she is free.        Additional history: as documented    MEDICAL HISTORY  Patient Active Problem List   Diagnosis     Depression with anxiety     Diverticulosis of large intestine without hemorrhage     Examination of participant in clinical trial     Hypercholesteremia     Post-traumatic headache     Pulmonary emphysema, unspecified emphysema type (H)     Vitamin D deficiency     Hx of colonic polyps       Current Outpatient Medications   Medication Sig Dispense Refill     albuterol (PROVENTIL) (2.5 MG/3ML) 0.083% neb solution Take 1 vial (2.5 mg) by nebulization every 6 hours as needed for shortness of breath / dyspnea or wheezing 1 Box 0     aspirin (ASA) 81 MG EC tablet        atorvastatin (LIPITOR) 20 MG tablet        BuPROPion HCl (WELLBUTRIN PO) Take  by mouth.         CITALOPRAM HYDROBROMIDE PO Take 10 mg by mouth.         diclofenac (VOLTAREN) 1 % topical gel Place 2 g onto the skin 4 times daily 100 g 1     omeprazole (PRILOSEC OTC) 20 MG EC tablet Take 20 mg by mouth       umeclidinium-vilanterol (ANORO ELLIPTA) 62.5-25 MCG/INH oral inhaler        Cephalexin (KEFLEX PO) Take 1 capsule by mouth.         clotrimazole 10 MG mike Take 1 Mike (10 mg) by mouth 5 times daily (Patient not taking: Reported on 10/7/2020) 30 Mike 0       Allergies   Allergen Reactions     Nkda [No  "Known Drug Allergies]        No family history on file.    Additional medical/Social/Surgical histories reviewed in EPIC and updated as appropriate.        PHYSICAL EXAM    Vitals:    10/07/20 1334   BP: 123/72   BP Location: Right arm   Patient Position: Sitting   Cuff Size: Adult Regular   Pulse: 69   Resp: 20   SpO2: 100%   Weight: 73.9 kg (163 lb)   Height: 1.549 m (5' 1\")     General  - normal appearance, in no obvious distress  HEENT  - conjunctivae not injected, moist mucous membranes  CV  - normal radial pulse  Pulm  - normal respiratory pattern, non-labored  Musculoskeletal - right shoulder  - inspection: normal bone and joint alignment, no obvious deformity, no scapular winging, no AC step-off  - palpation: no bony or soft tissue tenderness, normal clavicle, non-tender AC  - ROM:  160 deg flexion   150 deg abduction  - strength: 5/5  strength, 5/5 in all shoulder planes  Neuro  - no sensory or motor deficit, grossly normal coordination, normal muscle tone  Skin  - no ecchymosis, erythema, warmth, or induration, no obvious rash  Psych  - interactive, appropriate, normal mood and affect         ASSESSMENT & PLAN  Ms. Conde is a 75 year old female who presents to clinic today with right shoulder pain.    I reviewed her x-ray in the room with her which does show severe AC osteoarthritis.    We had a good discussion centering around arthritis of the AC joint and arthritis in general.  We discussed pathophysiology and treatment strategies including watchful waiting, topical treatments, oral analgesics, avoiding exacerbating activities, and the role of corticosteroid injections.    I am happy that she is doing better, she does know that a corticosteroid injection may be something we could do in the future, if need be.  She should continue to use topical treatment with Voltaren gel, as this is helping.    If she continues to do well we can follow-up as needed for this and other issues.    It was a pleasure " seeing Dior today.    Jamal Quijano DO, [facility]  Primary Care Sports Medicine      This note was constructed using Dragon dictation software, please excuse any minor errors in spelling, grammar, or syntax.        Again, thank you for allowing me to participate in the care of your patient.        Sincerely,        Jamal Quijano DO

## 2020-10-07 NOTE — NURSING NOTE
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      She requests these members of her care team be copied on today's visit information:     PCP: Tyler Brothers    Referring Provider:  Myra Quijano, APRN CNP  [address]  [place] [address]    /72 (BP Location: Right arm, Patient Position: Sitting, Cuff Size: Adult Regular)   Pulse 69   Resp 20   Ht 1.549 m (5' 1\")   Wt 73.9 kg (163 lb)   SpO2 100%   BMI 30.80 kg/m      Do you need any medication refills at today's visit? No    Janessa Salomon LPN      "

## 2021-01-15 ENCOUNTER — HEALTH MAINTENANCE LETTER (OUTPATIENT)
Age: 76
End: 2021-01-15

## 2021-10-24 ENCOUNTER — HEALTH MAINTENANCE LETTER (OUTPATIENT)
Age: 76
End: 2021-10-24

## 2022-01-31 DIAGNOSIS — R06.02 SOB (SHORTNESS OF BREATH): ICD-10-CM

## 2022-01-31 DIAGNOSIS — J44.9 COPD (CHRONIC OBSTRUCTIVE PULMONARY DISEASE) (H): Primary | ICD-10-CM

## 2022-02-13 ENCOUNTER — HEALTH MAINTENANCE LETTER (OUTPATIENT)
Age: 77
End: 2022-02-13

## 2022-03-10 ENCOUNTER — ANCILLARY PROCEDURE (OUTPATIENT)
Dept: GENERAL RADIOLOGY | Facility: CLINIC | Age: 77
End: 2022-03-10
Payer: COMMERCIAL

## 2022-03-10 ENCOUNTER — OFFICE VISIT (OUTPATIENT)
Dept: NURSING | Facility: CLINIC | Age: 77
End: 2022-03-10
Payer: COMMERCIAL

## 2022-03-10 ENCOUNTER — OFFICE VISIT (OUTPATIENT)
Dept: PULMONOLOGY | Facility: CLINIC | Age: 77
End: 2022-03-10
Payer: COMMERCIAL

## 2022-03-10 VITALS — HEART RATE: 87 BPM | WEIGHT: 157.9 LBS | OXYGEN SATURATION: 96 % | BODY MASS INDEX: 29.81 KG/M2 | HEIGHT: 61 IN

## 2022-03-10 VITALS
WEIGHT: 157 LBS | HEIGHT: 61 IN | DIASTOLIC BLOOD PRESSURE: 63 MMHG | OXYGEN SATURATION: 95 % | BODY MASS INDEX: 29.64 KG/M2 | HEART RATE: 71 BPM | RESPIRATION RATE: 16 BRPM | SYSTOLIC BLOOD PRESSURE: 117 MMHG

## 2022-03-10 DIAGNOSIS — R06.02 SOB (SHORTNESS OF BREATH): ICD-10-CM

## 2022-03-10 DIAGNOSIS — J43.2 CENTRILOBULAR EMPHYSEMA (H): Primary | ICD-10-CM

## 2022-03-10 DIAGNOSIS — J44.9 COPD (CHRONIC OBSTRUCTIVE PULMONARY DISEASE) (H): ICD-10-CM

## 2022-03-10 PROCEDURE — 94726 PLETHYSMOGRAPHY LUNG VOLUMES: CPT | Performed by: INTERNAL MEDICINE

## 2022-03-10 PROCEDURE — 94375 RESPIRATORY FLOW VOLUME LOOP: CPT | Performed by: INTERNAL MEDICINE

## 2022-03-10 PROCEDURE — 94729 DIFFUSING CAPACITY: CPT | Performed by: INTERNAL MEDICINE

## 2022-03-10 PROCEDURE — 99205 OFFICE O/P NEW HI 60 MIN: CPT | Mod: 25 | Performed by: INTERNAL MEDICINE

## 2022-03-10 PROCEDURE — 71046 X-RAY EXAM CHEST 2 VIEWS: CPT | Mod: GC | Performed by: RADIOLOGY

## 2022-03-10 ASSESSMENT — PAIN SCALES - GENERAL: PAINLEVEL: NO PAIN (0)

## 2022-03-10 NOTE — PROGRESS NOTES
Pulmonary Clinic New Patient Consult  Reason for Consult: COPD  History of Present Illness  I had the pleasure of seeing Dior Conde, who is a pleasant 77-year-old female who presents to clinic for an evaluation and management of COPD.  She is here in company of her daughter-Kati.  To briefly review, Dior was diagnosed with COPD several years ago.  She was being seen by an outside physician and her COPD is managed with Anoro.  She was previously managed with Symbicort and Spiriva but got switched to Anoro for better insurance coverage.  She also uses rescue albuterol as needed.  She has nebulized ipratropium/albuterol for periods of flares or increased symptoms.  Her symptoms and COPD were mostly controlled for the most part.  However, in the last 2 years she has noted increased symptoms of shortness of breath and increased reliance on albuterol rescue inhaler.  She has been more sedentary due to the pandemic restrictions in the last 2 years due to COVID-19.  She used to be able to take daily walks in the past prior to the COVID-19 pandemic, but has noticed reduced exercise tolerance.  Her weight is mostly stable.  She denies any chest pains, no orthopnea, no PND and no leg swellings.  She denies any palpitations, no fevers and no significant weight loss.  She has loud snoring at night but has not been evaluated for sleep apnea.  Her dad was diagnosed with sleep apnea.  She also thinks that she has Trush on her tongue which has not been treated.  Former smoker, quit , 1ppd x 40yrs. She worked as a  for several years and she is retired. 2 uncles  from lung cancer in their 50's and has an uncle with copd.    Review of Systems:  10 of 14 systems reviewed and are negative unless otherwise stated in HPI.    Past Medical History:   Diagnosis Date     Bleeding ulcer      Breast cyst     13 years old       Past Surgical History:   Procedure Laterality Date     HERNIA REPAIR         No family history on  file.    Social History     Socioeconomic History     Marital status:      Spouse name: None     Number of children: None     Years of education: None     Highest education level: None   Occupational History     None   Tobacco Use     Smoking status: Former Smoker     Packs/day: 1.00     Years: 40.00     Pack years: 40.00     Types: Cigarettes     Quit date: 2000     Years since quittin.4     Smokeless tobacco: Never Used   Substance and Sexual Activity     Alcohol use: No     Drug use: No     Sexual activity: None   Other Topics Concern     None   Social History Narrative     None     Social Determinants of Health     Financial Resource Strain: Not on file   Food Insecurity: Not on file   Transportation Needs: Not on file   Physical Activity: Not on file   Stress: Not on file   Social Connections: Not on file   Intimate Partner Violence: Not on file   Housing Stability: Not on file         Allergies   Allergen Reactions     Nkda [No Known Drug Allergies]          Current Outpatient Medications:      atorvastatin (LIPITOR) 20 MG tablet, , Disp: , Rfl:      CITALOPRAM HYDROBROMIDE PO, Take 10 mg by mouth.  , Disp: , Rfl:      omeprazole (PRILOSEC) 20 MG DR capsule, , Disp: , Rfl:      umeclidinium-vilanterol (ANORO ELLIPTA) 62.5-25 MCG/INH oral inhaler, , Disp: , Rfl:      albuterol (PROVENTIL) (2.5 MG/3ML) 0.083% neb solution, Take 1 vial (2.5 mg) by nebulization every 6 hours as needed for shortness of breath / dyspnea or wheezing (Patient not taking: Reported on 3/10/2022), Disp: 1 Box, Rfl: 0     aspirin (ASA) 81 MG EC tablet, , Disp: , Rfl:      BuPROPion HCl (WELLBUTRIN PO), Take  by mouth.   (Patient not taking: Reported on 3/10/2022), Disp: , Rfl:      Cephalexin (KEFLEX PO), Take 1 capsule by mouth.   (Patient not taking: Reported on 3/10/2022), Disp: , Rfl:      clotrimazole 10 MG mike, Take 1 Mike (10 mg) by mouth 5 times daily (Patient not taking: Reported on 10/7/2020), Disp: 30  "Mike, Rfl: 0     diclofenac (VOLTAREN) 1 % topical gel, Place 2 g onto the skin 4 times daily, Disp: 100 g, Rfl: 1      Physical Exam:  /63   Pulse 71   Resp 16   Ht 1.549 m (5' 1\")   Wt 71.2 kg (157 lb)   SpO2 95%   BMI 29.66 kg/m    GENERAL: Well developed, well nourished, alert, and in no apparent distress.  HEENT: Normocephalic, atraumatic. PERRL, EOMI. Oral mucosa is moist. No perioral cyanosis.  NECK: supple, no masses, no thyromegaly.  RESP:  Normal respiratory effort.  CTAB.  No rales, wheezes, rhonchi.  No cyanosis or clubbing.  CV: Normal S1, S2, regular rhythm, normal rate. No murmur.  No LE edema.   ABDOMEN:  Soft, non-tender, non-distended.   SKIN: warm and dry. No rash.  NEURO: AAOx3.  Normal gait.  Fluent speech.  PSYCH: mentation appears normal.       Results:  PFTs: Reviewed and discussed with patient-mild obstruction with significant hyperinflation and air trapping.  Relatively preserved diffusion capacity  Most Recent [facility] Pulmonary Function Testing    FVC-Pred   Date Value Ref Range Status   03/10/2022 2.33 L      FVC-Pre   Date Value Ref Range Status   03/10/2022 2.19 L      FVC-%Pred-Pre   Date Value Ref Range Status   03/10/2022 93 %      FEV1-Pre   Date Value Ref Range Status   03/10/2022 1.35 L      FEV1-%Pred-Pre   Date Value Ref Range Status   03/10/2022 75 %      FEV1FVC-Pred   Date Value Ref Range Status   03/10/2022 78 %      FEV1FVC-Pre   Date Value Ref Range Status   03/10/2022 62 %      No results found for: 20029  FEFMax-Pred   Date Value Ref Range Status   03/10/2022 4.53 L/sec      FEFMax-Pre   Date Value Ref Range Status   03/10/2022 4.06 L/sec      FEFMax-%Pred-Pre   Date Value Ref Range Status   03/10/2022 89 %      ExpTime-Pre   Date Value Ref Range Status   03/10/2022 10.64 sec      FIFMax-Pre   Date Value Ref Range Status   03/10/2022 4.28 L/sec      FEV1FEV6-Pred   Date Value Ref Range Status   03/10/2022 78 %      FEV1FEV6-Pre   Date Value Ref Range Status "   03/10/2022 62 %      No results found for: 20055  Imaging (personally reviewed in clinic today): CXR 3/10/2022  Impression:   1. No focal airspace disease.  2. Stable appearing high lung volumes with flattening of the  hemidiaphragms which can be seen in patients with chronic obstructive  pulmonary disease.  3. Stable appearing streaky bibasilar opacities which may represent  atelectasis versus scarring.      Assessment and Plan:   COPD- Group B   She appears to have increased symptoms of shortness of breath and increased reliance on albuterol.  Her PFTs reveal mild obstruction but with significant hyperinflation and air trapping.  Her diffusion capacity is relatively preserved.  She is on a LAMA/LABA-Anoro which I believe is appropriate.  She may benefit from the addition of a triple inhaler regimen such as Trelegy however it appears that she is dealing with some thrush.  It is reasonable for us to treat her depression when treated, we will consider switching to Trelegy.  She does not have frequent AECOPDs and does not have significant sputum production, she may not benefit from chronic azithromycin.   I suspect that some of her decline may be due to deconditioning due to less activity from restrictions due to COVID-19 pandemic.  She will benefit from pulmonary rehab however she would like to increase her activities on her own by taking escalated walks.     Snoring  She may have underlying sleep apnea.  She has been hesitant about getting evaluated with a sleep study as she thinks that she may be unable to tolerate CPAP due to her history of claustrophobia.  I did show her a nasal pillow mask in clinic today, and she believes that she may be able to tolerate that.  We will consider referring her to sleep medicine for evaluation of ANIL.  We discussed how untreated ANIL can make COPD worse.    Questions and concerns were answered to the patient's satisfaction.  she was provided with my contact information should new  questions or concerns arise in the interim.  she should return to clinic in 6 months with PFTs  Up to date on vaccination  I spent a total of 60 minutes face to face with Dior Conde during today's office visit. Over 50% of this time was spent counseling the patient and/or coordinating care regarding their pulmonary disease.    Donna Webb MD  Pulmonary, Critical Care and Sleep Medicine  [facility]  Pager: [phone]      The above note was dictated using voice recognition software and may include typographical errors. Please contact the author for any clarifications.

## 2022-03-10 NOTE — PROGRESS NOTES
"Dior Conde's goals for this visit include: Consult  She requests these members of her care team be copied on today's visit information: PCP    PCP: Tyler Brothers    Referring Provider:  No referring provider defined for this encounter.    /63   Pulse 71   Resp 16   Ht 1.549 m (5' 1\")   Wt 71.2 kg (157 lb)   SpO2 95%   BMI 29.66 kg/m      Do you need any medication refills at today's visit? N    Franklin Flores LPN  Pulmonary Medicine:  [facility]  Phone: [phone] Fax: [phone]      "

## 2022-03-13 LAB
DLCOUNC-%PRED-PRE: 74 %
DLCOUNC-PRE: 12.78 ML/MIN/MMHG
DLCOUNC-PRED: 17.05 ML/MIN/MMHG
ERV-%PRED-PRE: 81 %
ERV-PRE: 0.27 L
ERV-PRED: 0.33 L
EXPTIME-PRE: 10.64 SEC
FEF2575-%PRED-PRE: 41 %
FEF2575-PRE: 0.63 L/SEC
FEF2575-PRED: 1.51 L/SEC
FEFMAX-%PRED-PRE: 89 %
FEFMAX-PRE: 4.06 L/SEC
FEFMAX-PRED: 4.53 L/SEC
FEV1-%PRED-PRE: 75 %
FEV1-PRE: 1.35 L
FEV1FEV6-PRE: 62 %
FEV1FEV6-PRED: 78 %
FEV1FVC-PRE: 62 %
FEV1FVC-PRED: 78 %
FEV1SVC-PRE: 58 %
FEV1SVC-PRED: 71 %
FIFMAX-PRE: 4.28 L/SEC
FRCPLETH-%PRED-PRE: 146 %
FRCPLETH-PRE: 3.74 L
FRCPLETH-PRED: 2.55 L
FVC-%PRED-PRE: 93 %
FVC-PRE: 2.19 L
FVC-PRED: 2.33 L
IC-%PRED-PRE: 95 %
IC-PRE: 2.08 L
IC-PRED: 2.18 L
RVPLETH-%PRED-PRE: 168 %
RVPLETH-PRE: 3.46 L
RVPLETH-PRED: 2.05 L
TLCPLETH-%PRED-PRE: 131 %
TLCPLETH-PRE: 5.82 L
TLCPLETH-PRED: 4.44 L
VA-%PRED-PRE: 102 %
VA-PRE: 4.17 L
VC-%PRED-PRE: 93 %
VC-PRE: 2.35 L
VC-PRED: 2.51 L

## 2022-03-23 NOTE — RESULT ENCOUNTER NOTE
Results discussed directly with patient while patient was present. Any further details documented in the note.   Donna Webb MD

## 2022-09-14 ENCOUNTER — OFFICE VISIT (OUTPATIENT)
Dept: NURSING | Facility: CLINIC | Age: 77
End: 2022-09-14
Payer: COMMERCIAL

## 2022-09-14 ENCOUNTER — OFFICE VISIT (OUTPATIENT)
Dept: PULMONOLOGY | Facility: CLINIC | Age: 77
End: 2022-09-14
Payer: COMMERCIAL

## 2022-09-14 VITALS — OXYGEN SATURATION: 97 % | HEART RATE: 65 BPM | WEIGHT: 152.9 LBS | BODY MASS INDEX: 28.89 KG/M2

## 2022-09-14 VITALS
RESPIRATION RATE: 18 BRPM | WEIGHT: 152 LBS | HEIGHT: 61 IN | BODY MASS INDEX: 28.7 KG/M2 | DIASTOLIC BLOOD PRESSURE: 60 MMHG | SYSTOLIC BLOOD PRESSURE: 120 MMHG | OXYGEN SATURATION: 96 % | HEART RATE: 53 BPM

## 2022-09-14 DIAGNOSIS — J20.9 ACUTE BRONCHITIS WITH SYMPTOMS > 10 DAYS: ICD-10-CM

## 2022-09-14 DIAGNOSIS — J43.2 CENTRILOBULAR EMPHYSEMA (H): Primary | ICD-10-CM

## 2022-09-14 DIAGNOSIS — J43.2 CENTRILOBULAR EMPHYSEMA (H): ICD-10-CM

## 2022-09-14 LAB
DLCOUNC-%PRED-PRE: 73 %
DLCOUNC-PRE: 12.46 ML/MIN/MMHG
DLCOUNC-PRED: 17.05 ML/MIN/MMHG
ERV-%PRED-PRE: 85 %
ERV-PRE: 0.32 L
ERV-PRED: 0.37 L
EXPTIME-PRE: 10.94 SEC
FEF2575-%PRED-PRE: 30 %
FEF2575-PRE: 0.46 L/SEC
FEF2575-PRED: 1.51 L/SEC
FEFMAX-%PRED-PRE: 83 %
FEFMAX-PRE: 3.76 L/SEC
FEFMAX-PRED: 4.53 L/SEC
FEV1-%PRED-PRE: 62 %
FEV1-PRE: 1.12 L
FEV1FEV6-PRE: 58 %
FEV1FEV6-PRED: 78 %
FEV1FVC-PRE: 56 %
FEV1FVC-PRED: 78 %
FEV1SVC-PRE: 52 %
FEV1SVC-PRED: 71 %
FIFMAX-PRE: 4.03 L/SEC
FVC-%PRED-PRE: 86 %
FVC-PRE: 2.02 L
FVC-PRED: 2.33 L
IC-%PRED-PRE: 85 %
IC-PRE: 1.83 L
IC-PRED: 2.14 L
VA-%PRED-PRE: 100 %
VA-PRE: 4.12 L
VC-%PRED-PRE: 85 %
VC-PRE: 2.15 L
VC-PRED: 2.51 L

## 2022-09-14 PROCEDURE — 94729 DIFFUSING CAPACITY: CPT | Performed by: INTERNAL MEDICINE

## 2022-09-14 PROCEDURE — 99215 OFFICE O/P EST HI 40 MIN: CPT | Mod: 25 | Performed by: INTERNAL MEDICINE

## 2022-09-14 PROCEDURE — 94375 RESPIRATORY FLOW VOLUME LOOP: CPT | Performed by: INTERNAL MEDICINE

## 2022-09-14 RX ORDER — ALBUTEROL SULFATE 0.83 MG/ML
2.5 SOLUTION RESPIRATORY (INHALATION) EVERY 6 HOURS PRN
Qty: 90 ML | Refills: 11 | Status: SHIPPED | OUTPATIENT
Start: 2022-09-14

## 2022-09-14 RX ORDER — ALBUTEROL SULFATE 90 UG/1
2 AEROSOL, METERED RESPIRATORY (INHALATION) 4 TIMES DAILY
COMMUNITY
Start: 2021-04-05 | End: 2022-09-14

## 2022-09-14 RX ORDER — ALBUTEROL SULFATE 90 UG/1
2 AEROSOL, METERED RESPIRATORY (INHALATION) 4 TIMES DAILY
Qty: 54 G | Refills: 3 | Status: SHIPPED | OUTPATIENT
Start: 2022-09-14 | End: 2024-04-15

## 2022-09-14 ASSESSMENT — PAIN SCALES - GENERAL: PAINLEVEL: NO PAIN (0)

## 2022-09-14 NOTE — PROGRESS NOTES
Pulmonary Clinic Return Patient Visit  Reason for Visit: COPD  History of Present Illness  Dior Conde is a pleasant 77-year-old female who presents to clinic for follow up of COPD. I last saw her in clinic in 3/2022.  To briefly review, Dior was diagnosed with COPD several years ago.  She was being seen by an outside physician and her COPD is managed with Anoro.  She was previously managed with Symbicort and Spiriva but got switched to Anoro for better insurance coverage.  She also uses rescue albuterol as needed.  She has nebulized ipratropium/albuterol for periods of flares or increased symptoms.  When I saw her in clinic, she appeared to have increased exertional SOB which I thought may have been related to COPD due to evidence of moderate obstruction, air trapping and hyperinflation on spirometric evaluation. We discussed escalating her therapy to triple inhaler regimen but she was being treated for thrush and so we decided to wait till the next visit.  Today, symptoms are unchanged. She continues to struggle with dyspnea particularly during activities such as walking up a flight of stairs. She has lots of responsibilities such as caring for her disabled children which is very demanding. She has been more sedentary but has been working on increasing her activities by taking short walks with her small dog.  She denies any cough, wheezing, chest pain nor pedal swellings.  Former smoker, quit , 1ppd x 40yrs. She worked as a  for several years and she is retired. 2 uncles  from lung cancer in their 50's and has another uncle with copd.    Review of Systems:  10 of 14 systems reviewed and are negative unless otherwise stated in HPI.    Past Medical History:   Diagnosis Date     Bleeding ulcer      Breast cyst     13 years old       Past Surgical History:   Procedure Laterality Date     HERNIA REPAIR         No family history on file.    Social History     Socioeconomic History     Marital  status:      Spouse name: None     Number of children: None     Years of education: None     Highest education level: None   Occupational History     None   Tobacco Use     Smoking status: Former Smoker     Packs/day: 1.00     Years: 40.00     Pack years: 40.00     Types: Cigarettes     Quit date: 2000     Years since quittin.4     Smokeless tobacco: Never Used   Substance and Sexual Activity     Alcohol use: No     Drug use: No     Sexual activity: None   Other Topics Concern     None   Social History Narrative     None     Social Determinants of Health     Financial Resource Strain: Not on file   Food Insecurity: Not on file   Transportation Needs: Not on file   Physical Activity: Not on file   Stress: Not on file   Social Connections: Not on file   Intimate Partner Violence: Not on file   Housing Stability: Not on file         Allergies   Allergen Reactions     Nkda [No Known Drug Allergies]          Current Outpatient Medications:      albuterol (PROAIR HFA/PROVENTIL HFA/VENTOLIN HFA) 108 (90 Base) MCG/ACT inhaler, Inhale 2 puffs into the lungs 4 times daily, Disp: , Rfl:      atorvastatin (LIPITOR) 20 MG tablet, , Disp: , Rfl:      CITALOPRAM HYDROBROMIDE PO, Take 10 mg by mouth.  , Disp: , Rfl:      omeprazole (PRILOSEC) 20 MG DR capsule, , Disp: , Rfl:      umeclidinium-vilanterol (ANORO ELLIPTA) 62.5-25 MCG/INH oral inhaler, , Disp: , Rfl:      albuterol (PROVENTIL) (2.5 MG/3ML) 0.083% neb solution, Take 1 vial (2.5 mg) by nebulization every 6 hours as needed for shortness of breath / dyspnea or wheezing (Patient not taking: No sig reported), Disp: 1 Box, Rfl: 0     aspirin (ASA) 81 MG EC tablet, , Disp: , Rfl:      BuPROPion HCl (WELLBUTRIN PO), Take  by mouth.   (Patient not taking: No sig reported), Disp: , Rfl:      Cephalexin (KEFLEX PO), Take 1 capsule by mouth.   (Patient not taking: No sig reported), Disp: , Rfl:      clotrimazole 10 MG mike, Take 1 Mike (10 mg) by mouth 5 times  "daily (Patient not taking: No sig reported), Disp: 30 Mike, Rfl: 0     diclofenac (VOLTAREN) 1 % topical gel, Place 2 g onto the skin 4 times daily, Disp: 100 g, Rfl: 1      Physical Exam:  /60   Pulse 53   Resp 18   Ht 1.549 m (5' 1\")   Wt 68.9 kg (152 lb)   SpO2 96%   BMI 28.72 kg/m    GENERAL: Well developed, well nourished, alert, and in no apparent distress.  HEENT: Normocephalic, atraumatic. PERRL, EOMI. Oral mucosa is moist. No perioral cyanosis.  NECK: supple, no masses, no thyromegaly.  RESP:  Normal respiratory effort.  CTAB.  No rales, wheezes, rhonchi.  No cyanosis or clubbing.  CV: Normal S1, S2, regular rhythm, normal rate. No murmur.  No LE edema.   ABDOMEN:  Soft, non-tender, non-distended.   SKIN: warm and dry. No rash.  NEURO: AAOx3.  Normal gait.  Fluent speech.  PSYCH: mentation appears normal.     Results:  PFTs: Reviewed and discussed with patient-mild obstruction with significant hyperinflation and air trapping.  Relatively preserved diffusion capacity  Most Recent [facility] Pulmonary Function Testing    FVC-Pred   Date Value Ref Range Status   09/14/2022 2.33 L      FVC-Pre   Date Value Ref Range Status   09/14/2022 2.02 L      FVC-%Pred-Pre   Date Value Ref Range Status   09/14/2022 86 %      FEV1-Pre   Date Value Ref Range Status   09/14/2022 1.12 L      FEV1-%Pred-Pre   Date Value Ref Range Status   09/14/2022 62 %      FEV1FVC-Pred   Date Value Ref Range Status   09/14/2022 78 %      FEV1FVC-Pre   Date Value Ref Range Status   09/14/2022 56 %      No results found for: 20029  FEFMax-Pred   Date Value Ref Range Status   09/14/2022 4.53 L/sec      FEFMax-Pre   Date Value Ref Range Status   09/14/2022 3.76 L/sec      FEFMax-%Pred-Pre   Date Value Ref Range Status   09/14/2022 83 %      ExpTime-Pre   Date Value Ref Range Status   09/14/2022 10.94 sec      FIFMax-Pre   Date Value Ref Range Status   09/14/2022 4.03 L/sec      FEV1FEV6-Pred   Date Value Ref Range Status   09/14/2022 " 78 %      FEV1FEV6-Pre   Date Value Ref Range Status   09/14/2022 58 %      No results found for: 20055  Imaging (personally reviewed in clinic today): CXR 3/10/2022  Impression:   1. No focal airspace disease.  2. Stable appearing high lung volumes with flattening of the hemidiaphragms which can be seen in patients with chronic obstructive pulmonary disease.  3. Stable appearing streaky bibasilar opacities which may represent atelectasis versus scarring.      Assessment and Plan:   COPD- Group B  Repeat PFTs show stable lung function albeit continued obstruction and air trapping. She is still symptomatic while on LAMA/LABA-Anoro. She may benefit from escalation to triple inhaler regimen such as Trelegy. Thrush appears to have been treated and I will start her on a lower steroid dose formulation. We emphasized rinsing her mouth after each use. She does not have frequent AECOPDs and does not have significant sputum production, she may not benefit from chronic azithromycin. I also encouraged her to use her rescue inhaler more often particularly while walking up the stairs as that appears to be the most demanding activity for her.  I suspect that some of her decline may be due to deconditioning due to less activity from restrictions due to COVID-19 pandemic.  She will benefit from pulmonary rehab and I referred her to one today.    Questions and concerns were answered to the patient's satisfaction.  she was provided with my contact information should new questions or concerns arise in the interim.  she should return to clinic in 6 months   Up to date on vaccination  I spent a total of 40 minutes face to face with Dior Conde during today's office visit. Over 50% of this time was spent counseling the patient and/or coordinating care regarding their pulmonary disease.    Donna Webb MD  Pulmonary, Critical Care and Sleep Medicine  [facility]  Pager: [phone]      The above note was  dictated using voice recognition software and may include typographical errors. Please contact the author for any clarifications.

## 2022-09-14 NOTE — PROGRESS NOTES
"Dior Conde's goals for this visit include: Return  She requests these members of her care team be copied on today's visit information: PCP    PCP: Tyler Brothers    Referring Provider:  No referring provider defined for this encounter.    /60   Pulse 53   Resp 18   Ht 1.549 m (5' 1\")   Wt 68.9 kg (152 lb)   SpO2 96%   BMI 28.72 kg/m      Do you need any medication refills at today's visit? Y    Franklin Flores LPN  Pulmonary Medicine:  [facility]  Phone: [phone] Fax: [phone]      "

## 2022-10-15 ENCOUNTER — HEALTH MAINTENANCE LETTER (OUTPATIENT)
Age: 77
End: 2022-10-15

## 2022-12-03 ENCOUNTER — HEALTH MAINTENANCE LETTER (OUTPATIENT)
Age: 77
End: 2022-12-03

## 2023-02-21 DIAGNOSIS — J43.2 CENTRILOBULAR EMPHYSEMA (H): ICD-10-CM

## 2023-02-21 NOTE — TELEPHONE ENCOUNTER
Medication:     Fluticasone-Umeclidin-Vilanterol (TRELEGY ELLIPTA) 100-62.5-25 MCG/INH oral inhaler 60 each 11 9/14/2022 10/14/2022 --   Sig - Route: Inhale 1 puff into the lungs daily for 30 days - Inhalation       Date last written: 09/14/2022  Dispensed amount: 1  Refills: 11    Requested Pharmacy: [facility]    Pt's last office visit: 09/14/2022  Next scheduled office visit: 03/13/2023        Franklin Flores LPN  Pulmonary Medicine:  [facility]  Phone: [phone] Fax: [phone]

## 2023-03-13 ENCOUNTER — OFFICE VISIT (OUTPATIENT)
Dept: PULMONOLOGY | Facility: CLINIC | Age: 78
End: 2023-03-13
Payer: COMMERCIAL

## 2023-03-13 VITALS
HEIGHT: 61 IN | BODY MASS INDEX: 28.7 KG/M2 | DIASTOLIC BLOOD PRESSURE: 68 MMHG | OXYGEN SATURATION: 95 % | WEIGHT: 152 LBS | HEART RATE: 65 BPM | SYSTOLIC BLOOD PRESSURE: 117 MMHG | RESPIRATION RATE: 16 BRPM

## 2023-03-13 DIAGNOSIS — J43.2 CENTRILOBULAR EMPHYSEMA (H): Primary | ICD-10-CM

## 2023-03-13 PROCEDURE — 99215 OFFICE O/P EST HI 40 MIN: CPT | Performed by: INTERNAL MEDICINE

## 2023-03-13 ASSESSMENT — PAIN SCALES - GENERAL: PAINLEVEL: NO PAIN (0)

## 2023-03-13 NOTE — PROGRESS NOTES
Pulmonary Clinic Return Patient Visit  Reason for Visit: COPD  History of Present Illness  Dior Conde is a pleasant 78-year-old female who presents to clinic for follow up of COPD and is accompanied by her daughter who works here at [employer]. I last saw her in clinic in 2022.  To briefly review, Dior was diagnosed with COPD several years ago.  She was being seen by an outside physician and her COPD is managed with Anoro.  I escalated her regimen to triple inhaler- Trelegy with some improved control.  Today, she has no new complaints. Better control with low dose Trelegy and doesn't appear to have recurrence of thrush with rinsing her mouth after each use. She did not have any AECOPDs and does not have significant sputum production. She still has remnant SOB especially when going up a flight of stair but she is able to complete her IADLs including shoveling snow.   She has been more sedentary but has been working on increasing her activities by taking short walks with her small dog.  She denies any cough, wheezing, chest pain nor pedal swellings.  Former smoker, quit , 1ppd x 40yrs. She worked as a [employer] for several years and she is retired. 2 uncles  from lung cancer in their 50's and has another uncle with copd.    Review of Systems:  10 of 14 systems reviewed and are negative unless otherwise stated in HPI.    Past Medical History:   Diagnosis Date     Bleeding ulcer      Breast cyst     13 years old       Past Surgical History:   Procedure Laterality Date     HERNIA REPAIR         No family history on file.    Social History     Socioeconomic History     Marital status:      Spouse name: None     Number of children: None     Years of education: None     Highest education level: None   Occupational History     None   Tobacco Use     Smoking status: Former Smoker     Packs/day: 1.00     Years: 40.00     Pack years: 40.00     Types: Cigarettes     Quit date: 2000     Years since  "quittin.4     Smokeless tobacco: Never Used   Substance and Sexual Activity     Alcohol use: No     Drug use: No     Sexual activity: None   Other Topics Concern     None   Social History Narrative     None     Social Determinants of Health     Financial Resource Strain: Not on file   Food Insecurity: Not on file   Transportation Needs: Not on file   Physical Activity: Not on file   Stress: Not on file   Social Connections: Not on file   Intimate Partner Violence: Not on file   Housing Stability: Not on file         Allergies   Allergen Reactions     Nkda [No Known Drug Allergies]          Current Outpatient Medications:      albuterol (PROAIR HFA/PROVENTIL HFA/VENTOLIN HFA) 108 (90 Base) MCG/ACT inhaler, Inhale 2 puffs into the lungs 4 times daily, Disp: 54 g, Rfl: 3     albuterol (PROVENTIL) (2.5 MG/3ML) 0.083% neb solution, Take 1 vial (2.5 mg) by nebulization every 6 hours as needed for shortness of breath / dyspnea or wheezing, Disp: 90 mL, Rfl: 11     atorvastatin (LIPITOR) 20 MG tablet, , Disp: , Rfl:      CITALOPRAM HYDROBROMIDE PO, Take 10 mg by mouth.  , Disp: , Rfl:      Fluticasone-Umeclidin-Vilant (TRELEGY ELLIPTA) 100-62.5-25 MCG/ACT oral inhaler, Inhale 1 puff into the lungs daily, Disp: 3 each, Rfl: 3     omeprazole (PRILOSEC) 20 MG DR capsule, , Disp: , Rfl:       Physical Exam:  /68   Pulse 65   Resp 16   Ht 1.549 m (5' 0.98\")   Wt 68.9 kg (152 lb)   SpO2 95%   BMI 28.74 kg/m    GENERAL: Well developed, well nourished, alert, and in no apparent distress.  HEENT: Normocephalic, atraumatic. PERRL, EOMI. Oral mucosa is moist. No perioral cyanosis.  NECK: supple, no masses, no thyromegaly.  RESP:  Normal respiratory effort.  CTAB.  No rales, wheezes, rhonchi.  No cyanosis or clubbing.  CV: Normal S1, S2, regular rhythm, normal rate. No murmur.  No LE edema.   ABDOMEN:  Soft, non-tender, non-distended.   SKIN: warm and dry. No rash.  NEURO: AAOx3.  Normal gait.  Fluent speech.  PSYCH: " mentation appears normal.     Results:  PFTs: Reviewed and discussed with patient-mild obstruction with significant hyperinflation and air trapping.  Relatively preserved diffusion capacity  Most Recent [facility] Pulmonary Function Testing    FVC-Pred   Date Value Ref Range Status   09/14/2022 2.33 L      FVC-Pre   Date Value Ref Range Status   09/14/2022 2.02 L      FVC-%Pred-Pre   Date Value Ref Range Status   09/14/2022 86 %      FEV1-Pre   Date Value Ref Range Status   09/14/2022 1.12 L      FEV1-%Pred-Pre   Date Value Ref Range Status   09/14/2022 62 %      FEV1FVC-Pred   Date Value Ref Range Status   09/14/2022 78 %      FEV1FVC-Pre   Date Value Ref Range Status   09/14/2022 56 %      No results found for: 20029  FEFMax-Pred   Date Value Ref Range Status   09/14/2022 4.53 L/sec      FEFMax-Pre   Date Value Ref Range Status   09/14/2022 3.76 L/sec      FEFMax-%Pred-Pre   Date Value Ref Range Status   09/14/2022 83 %      ExpTime-Pre   Date Value Ref Range Status   09/14/2022 10.94 sec      FIFMax-Pre   Date Value Ref Range Status   09/14/2022 4.03 L/sec      FEV1FEV6-Pred   Date Value Ref Range Status   09/14/2022 78 %      FEV1FEV6-Pre   Date Value Ref Range Status   09/14/2022 58 %      No results found for: 20055  Imaging (personally reviewed in clinic today): CXR 3/10/2022  Impression:   1. No focal airspace disease.  2. Stable appearing high lung volumes with flattening of the hemidiaphragms which can be seen in patients with chronic obstructive pulmonary disease.  3. Stable appearing streaky bibasilar opacities which may represent atelectasis versus scarring.      Assessment and Plan:   COPD- Group B  Better control with low dose Trelegy and doesn't appear to have recurrence of thrush with rinsing her mouth after each use. She did not have any AECOPDs and does not have significant sputum production. I also encouraged her to use her rescue inhaler more often particularly while walking up the stairs as that  appears to be the most demanding activity for her.  I suspect that some of her decline may be due to deconditioning due to less activity from restrictions due to COVID-19 pandemic.  She will benefit from pulmonary rehab and I referred her to one today.    Questions and concerns were answered to the patient's satisfaction.  she was provided with my contact information should new questions or concerns arise in the interim.  she should return to clinic in 6 months   Up to date on vaccination  I spent a total of 40 minutes face to face with Dior Conde during today's office visit. Over 50% of this time was spent counseling the patient and/or coordinating care regarding their pulmonary disease.    Donna Webb MD  Pulmonary, Critical Care and Sleep Medicine  [facility]  Pager: [phone]      The above note was dictated using voice recognition software and may include typographical errors. Please contact the author for any clarifications.

## 2023-03-13 NOTE — PROGRESS NOTES
"Dior Conde's goals for this visit include: Return  She requests these members of her care team be copied on today's visit information: PCP    PCP: Tyler Brothers    Referring Provider:  No referring provider defined for this encounter.    /68   Pulse 65   Resp 16   Ht 1.549 m (5' 0.98\")   Wt 68.9 kg (152 lb)   SpO2 95%   BMI 28.74 kg/m      Do you need any medication refills at today's visit? N      Franklin Flores LPN  Pulmonary Medicine:  [facility]  Phone: [phone] Fax: [phone]      "

## 2023-08-22 DIAGNOSIS — J43.2 CENTRILOBULAR EMPHYSEMA (H): Primary | ICD-10-CM

## 2023-08-22 NOTE — TELEPHONE ENCOUNTER
Per patient's daughter Kati, patient is unable to afford Trelegy and is requesting to switch back to Anoro. Kati is requesting that 1 month of Anoro be sent to the [facility] and that the remaining refills be sent to the Rx for Anoro pended and routed to Dr. Webb for review/signature.    Franklin Flores LPN  Pulmonary Medicine:  [facility]  Phone: [phone] Fax: [phone]

## 2023-10-18 ENCOUNTER — OFFICE VISIT (OUTPATIENT)
Dept: PULMONOLOGY | Facility: CLINIC | Age: 78
End: 2023-10-18
Payer: COMMERCIAL

## 2023-10-18 ENCOUNTER — OFFICE VISIT (OUTPATIENT)
Dept: DERMATOLOGY | Facility: CLINIC | Age: 78
End: 2023-10-18
Payer: COMMERCIAL

## 2023-10-18 VITALS
WEIGHT: 160.9 LBS | HEART RATE: 57 BPM | OXYGEN SATURATION: 95 % | SYSTOLIC BLOOD PRESSURE: 116 MMHG | BODY MASS INDEX: 30.42 KG/M2 | DIASTOLIC BLOOD PRESSURE: 68 MMHG

## 2023-10-18 DIAGNOSIS — D22.9 MULTIPLE BENIGN NEVI: ICD-10-CM

## 2023-10-18 DIAGNOSIS — L72.0 EIC (EPIDERMAL INCLUSION CYST): ICD-10-CM

## 2023-10-18 DIAGNOSIS — L82.1 SEBORRHEIC KERATOSES: Primary | ICD-10-CM

## 2023-10-18 DIAGNOSIS — J20.9 ACUTE BRONCHITIS WITH SYMPTOMS > 10 DAYS: ICD-10-CM

## 2023-10-18 DIAGNOSIS — L81.4 LENTIGINES: ICD-10-CM

## 2023-10-18 DIAGNOSIS — D48.9 NEOPLASM OF UNCERTAIN BEHAVIOR: ICD-10-CM

## 2023-10-18 DIAGNOSIS — J43.2 CENTRILOBULAR EMPHYSEMA (H): ICD-10-CM

## 2023-10-18 DIAGNOSIS — D18.01 CHERRY ANGIOMA: ICD-10-CM

## 2023-10-18 PROCEDURE — 11103 TANGNTL BX SKIN EA SEP/ADDL: CPT | Performed by: PHYSICIAN ASSISTANT

## 2023-10-18 PROCEDURE — 88305 TISSUE EXAM BY PATHOLOGIST: CPT | Performed by: DERMATOLOGY

## 2023-10-18 PROCEDURE — 99203 OFFICE O/P NEW LOW 30 MIN: CPT | Mod: 25 | Performed by: PHYSICIAN ASSISTANT

## 2023-10-18 PROCEDURE — 11102 TANGNTL BX SKIN SINGLE LES: CPT | Performed by: PHYSICIAN ASSISTANT

## 2023-10-18 PROCEDURE — 99215 OFFICE O/P EST HI 40 MIN: CPT | Performed by: INTERNAL MEDICINE

## 2023-10-18 RX ORDER — BUPROPION HYDROCHLORIDE 150 MG/1
1 TABLET ORAL DAILY
COMMUNITY
Start: 2023-09-19

## 2023-10-18 RX ORDER — CITALOPRAM HYDROBROMIDE 20 MG/1
20 TABLET ORAL DAILY
COMMUNITY
Start: 2023-09-13

## 2023-10-18 RX ORDER — DONEPEZIL HYDROCHLORIDE 5 MG/1
1 TABLET, FILM COATED ORAL DAILY
COMMUNITY
Start: 2023-07-13

## 2023-10-18 RX ORDER — ALBUTEROL SULFATE 90 UG/1
2 AEROSOL, METERED RESPIRATORY (INHALATION) 4 TIMES DAILY
Qty: 54 G | Refills: 3 | Status: CANCELLED | OUTPATIENT
Start: 2023-10-18

## 2023-10-18 RX ORDER — ALBUTEROL SULFATE 0.83 MG/ML
2.5 SOLUTION RESPIRATORY (INHALATION) EVERY 6 HOURS PRN
Qty: 90 ML | Refills: 11 | Status: CANCELLED | OUTPATIENT
Start: 2023-10-18

## 2023-10-18 ASSESSMENT — PAIN SCALES - GENERAL: PAINLEVEL: NO PAIN (0)

## 2023-10-18 NOTE — PROGRESS NOTES
Dermatology Rooming Note    Dior Conde's goals for this visit include:   Chief Complaint   Patient presents with    Skin Check     FBSC - patient has spots of concern on upper back. Patient has spots/bumps under breasts that cause discomfort.        Jackie Myers

## 2023-10-18 NOTE — PROGRESS NOTES
Pulmonary Clinic Return Patient Visit  Reason for Visit: COPD  History of Present Illness  Dior Conde is a pleasant 78-year-old female who presents to clinic for follow up of COPD and is accompanied by her daughter Kati who works here at [employer]. I last saw her in clinic in 3/2023.  To briefly review, Dior was diagnosed with COPD several years ago.  She was being seen by an outside physician and her COPD is managed with Anoro.  I escalated her regimen to triple inhaler- Trelegy with some improved control however she was unable to tolerate it due to thrush.  Today,  she has been more symptomatic lately with increased dependence on albuterol rescue inhaler. She is more sedentary but has been working on increasing her activities by taking short walks with her small dog.  She denies any cough, wheezing, chest pain nor pedal swellings.  Former smoker, quit , 1ppd x 40yrs. She worked as a  for several years and she is retired. 2 uncles  from lung cancer in their 50's and has another uncle with copd.    Review of Systems:  10 of 14 systems reviewed and are negative unless otherwise stated in HPI.    Past Medical History:   Diagnosis Date    Bleeding ulcer     Breast cyst     13 years old       Past Surgical History:   Procedure Laterality Date    HERNIA REPAIR         History reviewed. No pertinent family history.    Social History     Socioeconomic History    Marital status:      Spouse name: None    Number of children: None    Years of education: None    Highest education level: None   Occupational History    None   Tobacco Use    Smoking status: Former Smoker     Packs/day: 1.00     Years: 40.00     Pack years: 40.00     Types: Cigarettes     Quit date: 2000     Years since quittin.4    Smokeless tobacco: Never Used   Substance and Sexual Activity    Alcohol use: No    Drug use: No    Sexual activity: None   Other Topics Concern    None   Social History Narrative    None      Social Determinants of Health     Financial Resource Strain: Not on file   Food Insecurity: Not on file   Transportation Needs: Not on file   Physical Activity: Not on file   Stress: Not on file   Social Connections: Not on file   Intimate Partner Violence: Not on file   Housing Stability: Not on file         Allergies   Allergen Reactions    Nkda [No Known Drug Allergy]          Current Outpatient Medications:     albuterol (PROAIR HFA/PROVENTIL HFA/VENTOLIN HFA) 108 (90 Base) MCG/ACT inhaler, Inhale 2 puffs into the lungs 4 times daily, Disp: 54 g, Rfl: 3    albuterol (PROVENTIL) (2.5 MG/3ML) 0.083% neb solution, Take 1 vial (2.5 mg) by nebulization every 6 hours as needed for shortness of breath / dyspnea or wheezing, Disp: 90 mL, Rfl: 11    atorvastatin (LIPITOR) 20 MG tablet, , Disp: , Rfl:     buPROPion (WELLBUTRIN XL) 150 MG 24 hr tablet, Take 1 tablet by mouth daily, Disp: , Rfl:     citalopram (CELEXA) 20 MG tablet, Take 20 mg by mouth daily, Disp: , Rfl:     omeprazole (PRILOSEC) 20 MG DR capsule, , Disp: , Rfl:     umeclidinium-vilanterol (ANORO ELLIPTA) 62.5-25 MCG/ACT oral inhaler, Inhale 1 puff into the lungs daily, Disp: 2 each, Rfl: 3    CITALOPRAM HYDROBROMIDE PO, Take 10 mg by mouth.   (Patient not taking: Reported on 10/18/2023), Disp: , Rfl:     donepezil (ARICEPT) 5 MG tablet, Take 1 tablet by mouth daily (Patient not taking: Reported on 10/18/2023), Disp: , Rfl:     Fluticasone-Umeclidin-Vilant (TRELEGY ELLIPTA) 100-62.5-25 MCG/ACT oral inhaler, Inhale 1 puff into the lungs daily, Disp: 3 each, Rfl: 3    umeclidinium-vilanterol (ANORO ELLIPTA) 62.5-25 MCG/ACT oral inhaler, Inhale 1 puff into the lungs daily, Disp: 1 each, Rfl: 0      Physical Exam:  /68 (BP Location: Left arm, Patient Position: Sitting, Cuff Size: Adult Regular)   Pulse 57   Wt 73 kg (160 lb 14.4 oz)   SpO2 95%   BMI 30.42 kg/m    GENERAL: Well developed, well nourished, alert, and in no apparent distress.  HEENT:  "Normocephalic, atraumatic. PERRL, EOMI. Oral mucosa is moist. No perioral cyanosis.  NECK: supple, no masses, no thyromegaly.  RESP:  Normal respiratory effort.  CTAB.  No rales, wheezes, rhonchi.  No cyanosis or clubbing.  CV: Normal S1, S2, regular rhythm, normal rate. No murmur.  No LE edema.   ABDOMEN:  Soft, non-tender, non-distended.   SKIN: warm and dry. No rash.  NEURO: AAOx3.  Normal gait.  Fluent speech.  PSYCH: mentation appears normal.     Results:  PFTs: Reviewed and discussed with patient-mild obstruction with significant hyperinflation and air trapping.  Relatively preserved diffusion capacity  Most Recent [facility]ze Pulmonary Function Testing    FVC-Pred   Date Value Ref Range Status   09/14/2022 2.33 L      FVC-Pre   Date Value Ref Range Status   09/14/2022 2.02 L      FVC-%Pred-Pre   Date Value Ref Range Status   09/14/2022 86 %      FEV1-Pre   Date Value Ref Range Status   09/14/2022 1.12 L      FEV1-%Pred-Pre   Date Value Ref Range Status   09/14/2022 62 %      FEV1FVC-Pred   Date Value Ref Range Status   09/14/2022 78 %      FEV1FVC-Pre   Date Value Ref Range Status   09/14/2022 56 %      No results found for: \"20029\"  FEFMax-Pred   Date Value Ref Range Status   09/14/2022 4.53 L/sec      FEFMax-Pre   Date Value Ref Range Status   09/14/2022 3.76 L/sec      FEFMax-%Pred-Pre   Date Value Ref Range Status   09/14/2022 83 %      ExpTime-Pre   Date Value Ref Range Status   09/14/2022 10.94 sec      FIFMax-Pre   Date Value Ref Range Status   09/14/2022 4.03 L/sec      FEV1FEV6-Pred   Date Value Ref Range Status   09/14/2022 78 %      FEV1FEV6-Pre   Date Value Ref Range Status   09/14/2022 58 %      No results found for: \"20055\"  Imaging (personally reviewed in clinic today): CXR 3/10/2022  Impression:   1. No focal airspace disease.  2. Stable appearing high lung volumes with flattening of the hemidiaphragms which can be seen in patients with chronic obstructive pulmonary disease.  3. Stable appearing " streaky bibasilar opacities which may represent atelectasis versus scarring.    Assessment and Plan:   COPD- Group B  Lung function has shown some decline and she appears to be more symptomatic with CAT of 24. She is using Anoro but I suspect that her inhaler technique is poor and I will try to get that fixed today with a demonstration. She has not been using her albuterol frequently.   I will give her a spacer that she can use with her albuterol inhaler to improve the benefit from using it.  I suspect that some of her decline may be due to deconditioning due to less activity from restrictions due to COVID-19 pandemic.  She will benefit from pulmonary rehab and I referred her to one today.    Questions and concerns were answered to the patient's satisfaction.  she was provided with my contact information should new questions or concerns arise in the interim.  she should return to clinic in 6 months   Up to date on vaccination  I spent a total of 40 minutes face to face with Dior Conde during today's office visit. Over 50% of this time was spent counseling the patient and/or coordinating care regarding their pulmonary disease.    Donna Webb MD  Pulmonary, Critical Care and Sleep Medicine  [facility]  Pager: [phone]      The above note was dictated using voice recognition software and may include typographical errors. Please contact the author for any clarifications.

## 2023-10-18 NOTE — LETTER
10/18/2023         RE: Dior Conde  [address]  [place] [address]        Dear Colleague,    Thank you for referring your patient, Dior Conde, to the [facility]. Please see a copy of my visit note below.    Dermatology Rooming Note    Dior Conde's goals for this visit include:   Chief Complaint   Patient presents with     Skin Check     FBSC - patient has spots of concern on upper back. Patient has spots/bumps under breasts that cause discomfort.        Jackie Myers      [facility] Dermatology Note  Encounter Date: Oct 18, 2023  Office Visit      Dermatology Problem List:  Last FBSC performed on 10/18/23    0. NUB, Right glabella, S/p Biopsy 10/18/23  0. NUB Right upper cheek, S/p Biopsy 10/18/23  0. NUB, Right lateral back. S/p Biopsy 10/18/23  0. NUB, left lateral back. S/p Biopsy 10/18/23  ____________________________________________    Assessment & Plan:  # Benign findings: multiple benign nevi, lentigines, cherry angiomas, SKs  - edu on benign etiology  - Signs and Symptoms of non-melanoma skin cancer and ABCDEs of melanoma reviewed with patient. Patient encouraged to perform monthly self skin exams and educated on how to perform them. UV precautions reviewed with patient. Patient was asked about new or changing moles/lesions on body.   - Sunscreen: Apply 20 minutes prior to going outdoors and reapply every two hours, when wet or sweating. We recommend using an SPF 30 or higher, and to use one that is water resistant.     - RTC for changes     # NUB, Right glabella - ddx: BCC vs other  - Shave biopsy performed today, see procedure note below.    #NUB, Right upper cheek - ddx; BCC vs other  - Shave biopsy performed today, see procedure note below.    #NUB, Right lateral back - ddx: BCC vs other  - Shave biopsy performed today, see procedure note below.     # NUB, Left lateral upper thigh  - NMSC vs MM vs other  - Shave biopsy performed today,  see procedure note below.    # EIC - R cheek  -reassured benign     Procedures Performed:   - Shave biopsy x4 procedure note, location(s): See above. After discussion of benefits and risks including but not limited to bleeding, infection, scar, incomplete removal, recurrence, and non-diagnostic biopsy, written consent and photographs were obtained. The area was cleaned with isopropyl alcohol. 0.5mL of 1% lidocaine with epinephrine was injected to obtain adequate anesthesia of lesion(s). Shave biopsy at site(s) performed. Hemostasis was achieved with aluminium chloride. Petrolatum ointment and a sterile dressing were applied. The patient tolerated the procedure and no complications were noted. The patient was provided with verbal and written post care instructions.      Follow-up: pending path results    Staff and scribe     Scribe disclosure:   I, Araseli George, am serving as a scribe to document services personally performed by Carmela Cool PA-C based on data collection and the provider's statements to me.     All risks, benefits and alternatives were discussed with patient.  Patient is in agreement and understands the assessment and plan.  All questions were answered.    Carmela Cool PA-C, [facility]  [facility] [place]: Phone: [phone], Fax: [phone]  [facility]: Phone: [phone],  Fax: [phone]  [facility]: Phone: [phone], Fax: [phone]  ____________________________________________    CC: Skin Check (FBSC - patient has spots of concern on upper back. Patient has spots/bumps under breasts that cause discomfort. )      HPI:  Ms. Dior Conde is a 78 year old female who presents today as a new patient for FBSC.     Patient reports a spot of concern on her upper back. Patient also has some spots/bumps under her breasts that cause discomfort.    Family hx of skin cancer: Unknown.      Patient is otherwise feeling well, without additional concerns.    Labs:  None     Physical Exam:  Vitals: There were no vitals taken for this visit.  SKIN: Full skin, which includes the head/face, both arms, chest, back, abdomen,both legs, genitalia and/or groin buttocks, digits and/or nails, was examined.   - Tanner's skin type II, Has <100 nevi  - There are dome shaped bright red papules on the trunk.   - Multiple regular brown pigmented macules and papules are identified on the trunk and extremities.   - Scattered brown macules on sun exposed areas.  - There are waxy stuck on tan to brown papules on the trunk.     - There is a raised dome shaped nodule with a central punctum located on right cheek.  - Right glabella there is a 8 mm shiny pink papule   - Right upper cheek 5 mm pink papule.   - Right lateral mid back there is 1 cm pink shiny patch  - Left lateral upper thigh there a 8 mm asymmetrical macule.   - No other lesions of concern on areas examined.      Medications:  Current Outpatient Medications   Medication     albuterol (PROAIR HFA/PROVENTIL HFA/VENTOLIN HFA) 108 (90 Base) MCG/ACT inhaler     albuterol (PROVENTIL) (2.5 MG/3ML) 0.083% neb solution     atorvastatin (LIPITOR) 20 MG tablet     buPROPion (WELLBUTRIN XL) 150 MG 24 hr tablet     CITALOPRAM HYDROBROMIDE PO     omeprazole (PRILOSEC) 20 MG DR capsule     umeclidinium-vilanterol (ANORO ELLIPTA) 62.5-25 MCG/ACT oral inhaler     Fluticasone-Umeclidin-Vilant (TRELEGY ELLIPTA) 100-62.5-25 MCG/ACT oral inhaler     umeclidinium-vilanterol (ANORO ELLIPTA) 62.5-25 MCG/ACT oral inhaler     No current facility-administered medications for this visit.      Past Medical/Surgical History:   Patient Active Problem List   Diagnosis     Depression with anxiety     Diverticulosis of large intestine without hemorrhage     Examination of participant in clinical trial     Hypercholesteremia     Post-traumatic headache     Pulmonary emphysema, unspecified  emphysema type (H)     Vitamin D deficiency     Hx of colonic polyps     Past Medical History:   Diagnosis Date     Bleeding ulcer      Breast cyst     13 years old                         Again, thank you for allowing me to participate in the care of your patient.        Sincerely,        Carmela Cool PA-C

## 2023-10-18 NOTE — NURSING NOTE
"Dior Conde's goals for this visit include:   Chief Complaint   Patient presents with    COPD     Follow-up on COPD       PCP: Tyler Brothers    Referring Provider:  No referring provider defined for this encounter.      Initial /68 (BP Location: Left arm, Patient Position: Sitting, Cuff Size: Adult Regular)   Pulse 57   Wt 73 kg (160 lb 14.4 oz)   SpO2 95%   BMI 30.42 kg/m   Estimated body mass index is 30.42 kg/m  as calculated from the following:    Height as of 3/13/23: 1.549 m (5' 0.98\").    Weight as of this encounter: 73 kg (160 lb 14.4 oz).    Medication Reconciliation: complete    Do you need any medication refills at today's visit? Yes    QUAN Hannah  Rheumatology/Infectious disease  [facility]   [phone]      "

## 2023-10-18 NOTE — PROGRESS NOTES
[facility] Dermatology Note  Encounter Date: Oct 18, 2023  Office Visit      Dermatology Problem List:  Last FBSC performed on 10/18/23    0. NUB, Right glabella, S/p Biopsy 10/18/23  0. NUB Right upper cheek, S/p Biopsy 10/18/23  0. NUB, Right lateral back. S/p Biopsy 10/18/23  0. NUB, left lateral back. S/p Biopsy 10/18/23  ____________________________________________    Assessment & Plan:  # Benign findings: multiple benign nevi, lentigines, cherry angiomas, SKs  - edu on benign etiology  - Signs and Symptoms of non-melanoma skin cancer and ABCDEs of melanoma reviewed with patient. Patient encouraged to perform monthly self skin exams and educated on how to perform them. UV precautions reviewed with patient. Patient was asked about new or changing moles/lesions on body.   - Sunscreen: Apply 20 minutes prior to going outdoors and reapply every two hours, when wet or sweating. We recommend using an SPF 30 or higher, and to use one that is water resistant.     - RTC for changes     # NUB, Right glabella - ddx: BCC vs other  - Shave biopsy performed today, see procedure note below.    #NUB, Right upper cheek - ddx; BCC vs other  - Shave biopsy performed today, see procedure note below.    #NUB, Right lateral back - ddx: BCC vs other  - Shave biopsy performed today, see procedure note below.     # NUB, Left lateral upper thigh  - NMSC vs MM vs other  - Shave biopsy performed today, see procedure note below.    # EIC - R cheek  -reassured benign     Procedures Performed:   - Shave biopsy x4 procedure note, location(s): See above. After discussion of benefits and risks including but not limited to bleeding, infection, scar, incomplete removal, recurrence, and non-diagnostic biopsy, written consent and photographs were obtained. The area was cleaned with isopropyl alcohol. 0.5mL of 1% lidocaine with epinephrine was injected to obtain adequate anesthesia of lesion(s). Shave biopsy at site(s) performed.  Hemostasis was achieved with aluminium chloride. Petrolatum ointment and a sterile dressing were applied. The patient tolerated the procedure and no complications were noted. The patient was provided with verbal and written post care instructions.      Follow-up: pending path results    Staff and scribe     Scribe disclosure:   I, Araseli Lovato, am serving as a scribe to document services personally performed by Carmela Cool PA-C based on data collection and the provider's statements to me.     All risks, benefits and alternatives were discussed with patient.  Patient is in agreement and understands the assessment and plan.  All questions were answered.    Carmela Cool PA-C, [facility]  [facility] [place]: Phone: [phone], Fax: [phone]  [facility]: Phone: [phone],  Fax: [phone]  [facility]: Phone: [phone], Fax: [phone]  ____________________________________________    CC: Skin Check (FBSC - patient has spots of concern on upper back. Patient has spots/bumps under breasts that cause discomfort. )      HPI:  Ms. Dior Conde is a 78 year old female who presents today as a new patient for FBSC.     Patient reports a spot of concern on her upper back. Patient also has some spots/bumps under her breasts that cause discomfort.    Family hx of skin cancer: Unknown.     Patient is otherwise feeling well, without additional concerns.    Labs:  None     Physical Exam:  Vitals: There were no vitals taken for this visit.  SKIN: Full skin, which includes the head/face, both arms, chest, back, abdomen,both legs, genitalia and/or groin buttocks, digits and/or nails, was examined.   - Tanner's skin type II, Has <100 nevi  - There are dome shaped bright red papules on the trunk.   - Multiple regular brown pigmented macules and papules are identified on the trunk and extremities.   -  Scattered brown macules on sun exposed areas.  - There are waxy stuck on tan to brown papules on the trunk.     - There is a raised dome shaped nodule with a central punctum located on right cheek.  - Right glabella there is a 8 mm shiny pink papule   - Right upper cheek 5 mm pink papule.   - Right lateral mid back there is 1 cm pink shiny patch  - Left lateral upper thigh there a 8 mm asymmetrical macule.   - No other lesions of concern on areas examined.      Medications:  Current Outpatient Medications   Medication    albuterol (PROAIR HFA/PROVENTIL HFA/VENTOLIN HFA) 108 (90 Base) MCG/ACT inhaler    albuterol (PROVENTIL) (2.5 MG/3ML) 0.083% neb solution    atorvastatin (LIPITOR) 20 MG tablet    buPROPion (WELLBUTRIN XL) 150 MG 24 hr tablet    CITALOPRAM HYDROBROMIDE PO    omeprazole (PRILOSEC) 20 MG DR capsule    umeclidinium-vilanterol (ANORO ELLIPTA) 62.5-25 MCG/ACT oral inhaler    Fluticasone-Umeclidin-Vilant (TRELEGY ELLIPTA) 100-62.5-25 MCG/ACT oral inhaler    umeclidinium-vilanterol (ANORO ELLIPTA) 62.5-25 MCG/ACT oral inhaler     No current facility-administered medications for this visit.      Past Medical/Surgical History:   Patient Active Problem List   Diagnosis    Depression with anxiety    Diverticulosis of large intestine without hemorrhage    Examination of participant in clinical trial    Hypercholesteremia    Post-traumatic headache    Pulmonary emphysema, unspecified emphysema type (H)    Vitamin D deficiency    Hx of colonic polyps     Past Medical History:   Diagnosis Date    Bleeding ulcer     Breast cyst     13 years old

## 2023-10-24 LAB
PATH REPORT.COMMENTS IMP SPEC: ABNORMAL
PATH REPORT.COMMENTS IMP SPEC: ABNORMAL
PATH REPORT.COMMENTS IMP SPEC: YES
PATH REPORT.FINAL DX SPEC: ABNORMAL
PATH REPORT.GROSS SPEC: ABNORMAL
PATH REPORT.MICROSCOPIC SPEC OTHER STN: ABNORMAL
PATH REPORT.RELEVANT HX SPEC: ABNORMAL

## 2023-11-01 ENCOUNTER — TELEPHONE (OUTPATIENT)
Dept: DERMATOLOGY | Facility: CLINIC | Age: 78
End: 2023-11-01
Payer: COMMERCIAL

## 2023-11-01 NOTE — TELEPHONE ENCOUNTER
"Case Report   Surgical Pathology Report                         Case: [id number]                                   Authorizing Provider:  Carmela Cool PA-C     Collected:           10/18/2023 01:06 PM           Ordering Location:     [facility]   Received:            10/18/2023 04:35 PM                                  [place]                                                                   Pathologist:           Avery Ross MD                                                       Specimens:   A) - Skin, Right glabella                                                                            B) - Skin, Right uppper cheek                                                                        C) - Skin, Right lateral back                                                                        D) - Skin, Left lateral upper thigh                                                        Final Diagnosis   A(1). Right glabella:  - Basal cell carcinoma, nodular type - (see description)      B(2). Right upper cheek:  - Intradermal melanocytic nevus - (see description)      C(3). Right lateral back:  - Basal cell carcinoma, superficial type - (see description)      D(4). Left lateral upper thigh:  - Lentigo/macular seborrheic keratosis overlap lesion - (see description)     Electronically signed by Avery Ross MD on 10/24/2023 at  1:00 AM   Clinical Information  [facility]   The patient is a 78 year old female.    Gross Description  [facility]   A(1). Skin, Right glabella:  The specimen is received in formalin with proper patient identification, labeled \"right glabella\".  The specimen consists of a 0.6 x 0.5 cm skin shave specimen containing a 0.5 x 0.5 cm tan-white, crusted skin surface.  The resection margin is inked blue and it is sectioned and submitted in A1.       B(2). Skin, Right uppper cheek:  The specimen is received in formalin with proper patient identification, labeled \"right upper " "cheek\".  The specimen consists of a 0.4 x 0.3 cm skin shave specimen containing a 0.3 x 0.3 cm tan-white lesion.  The resection margin is inked blue and it is bisected and submitted in B1.      C(3). Skin, Right lateral back:  The specimen is received in formalin with proper patient identification, labeled \"right lateral back\".  The specimen consists of a 1.4 x 0.9 cm skin shave specimen containing a tan-white skin surface.  The resection margin is inked blue and it is sectioned and submitted in C1.             D(4). Skin, Left lateral upper thigh:  The specimen is received in formalin with proper patient identification, labeled \"left lateral upper thigh\".  The specimen consists of a 0.7 x 0.4 cm skin shave specimen containing a 0.7 x 0.4 cm tan-brown lesion.  The resection margin is inked blue and it is bisected and submitted in D1.      Microscopic Description  [facility]   A. The specimen exhibits a tumor of atypical basaloid epithelium arranged as islands in the dermis with fibromyxoid stroma and clefting artifact.   B. The specimen exhibits a predominantly intradermal proliferation of melanocytes in nests and cords which mature with descent. The lesion extends to the deep margin.   C. The specimen exhibits a tumor of atypical basaloid epithelium arranged as buds off the epidermal undersurface with fibrotic stroma and clefting artifact.   D. The specimen exhibits lentiginous epidermal hyperplasia with increased melanin pigment in keratinocytes with fusion of some adjacent rete to form acanthotic plates and early pseudohorn cysts.    MCRS  N/A Yes Abnormal  [facility]   Performing Labs  [facility]   The technical component of this testing was completed at [facility]              Specimen Collected: 10/18/23  1:06 PM Last Resulted: 10/24/23  1:00 AM       Order Details        View Encounter        Lab and Collection Details        Routing        Result History   "   View All Conversations on this Encounter           Scans on Order [id number]    Document on 10/24/2023  1:00 AM by Avery Ross MD         Result Care Coordination      Result Notes     Monika Dong CMA  11/1/2023  3:24 PM CDT Back to Top      Spoke with patient's daughter Kati regarding the biopsy results and treatment recommendations.  Patient has been scheduled for a phone consult on 11/20/23 at 1115.  Mohs & Excision appointments scheduled for 11/27/2023 at 0830.      Monika Dong CMA  11/1/2023  2:57 PM CDT       Attempted to reach patient's daughter, Kati, left a message for her to return call to the clinic.    Monika Dong CMA  11/1/2023  1:58 PM CDT       Spoke with patient regarding results and treatment recommendations.  Patient would like us to also call her daughter to discuss this as well & to go over appointment scheduling as Dior's daughter will be bringing her to the appointment. We do have a consent to communicate on file.  Call daughter Kati Ordonez.    Monika Dong CMA  10/31/2023  4:25 PM CDT       3rd attempt to reach patient to discuss pathology results and treatment recommendations.  Will also send a [employer] message requesting patient return call to the clinic.    Nita Adam RN  10/27/2023  8:48 AM CDT       2nd attempt to reach pt, no answer, left message for her to return our call at [phone].     Nita Adam RN on 10/27/2023 at 8:48 AM    Nita Adam RN  10/25/2023  1:50 PM CDT       Writer called pt, no answer. Left message for pt to return our call at [phone].        Nita Adam RN on 10/25/2023 at 1:50 PM    Carmela Cool PA-C  10/25/2023 11:25 AM CDT       Please go over results with patient  - R glabella - BCC - needs MMS  - R lateral back - can do WLE or ED&C - ok to do with derm surg since seeing them anyway for above (I can do this too if she would rather do that)  - other two sites were beign, no need for further  treatment

## 2023-11-06 NOTE — TELEPHONE ENCOUNTER
Writer called pt, no answer. Left message for pt to return our call at [phone].     Needs excision checklist completed.    Nita Adam RN on 11/6/2023 at 2:55 PM

## 2023-11-08 NOTE — TELEPHONE ENCOUNTER
3rd attempt to reach pt, no answer. Left message for her to return our call at [phone].    Nita Adam RN on 11/8/2023 at 9:20 AM

## 2023-11-17 ENCOUNTER — TELEPHONE (OUTPATIENT)
Dept: DERMATOLOGY | Facility: CLINIC | Age: 78
End: 2023-11-17
Payer: COMMERCIAL

## 2023-11-17 NOTE — TELEPHONE ENCOUNTER
Spoke with patient's daughter Kati.  She will get photos of patient's glabella and her right lateral back.  She will try to locate the area that was biopsied on the right lateral back to photograph and will take a photo of the whole right lateral back area.

## 2023-11-20 ENCOUNTER — VIRTUAL VISIT (OUTPATIENT)
Dept: DERMATOLOGY | Facility: CLINIC | Age: 78
End: 2023-11-20
Payer: COMMERCIAL

## 2023-11-20 DIAGNOSIS — C44.519 BASAL CELL CARCINOMA (BCC) OF BACK: ICD-10-CM

## 2023-11-20 DIAGNOSIS — C44.319 BASAL CELL CARCINOMA (BCC) OF GLABELLA: Primary | ICD-10-CM

## 2023-11-20 PROCEDURE — 99441 PR PHYSICIAN TELEPHONE EVALUATION 5-10 MIN: CPT | Performed by: DERMATOLOGY

## 2023-11-20 ASSESSMENT — PAIN SCALES - GENERAL: PAINLEVEL: NO PAIN (0)

## 2023-11-20 ASSESSMENT — PATIENT HEALTH QUESTIONNAIRE - PHQ9: SUM OF ALL RESPONSES TO PHQ QUESTIONS 1-9: 9

## 2023-11-20 NOTE — PROGRESS NOTES
[facility] Dermatology Note  Encounter Date: Nov 20, 2023  [employer] and Telephone call [phone]  and [phone] . Location of teledermatologist: [facility].  Start time: 1:14 PM. End time: 1:20 PM.    Dermatologic Surgery Telemedicine Consult Note    Dermatology Problem List:  Last FBSC performed on 10/18/23  1. BCC, R glabella, nodular type, s/p biopsy 10/18/23  2. BCC, R lateral back, superficial type, s/p Biopsy 10/18/23  3. Intradermal melanocytic nevus, R upper cheek, s/p Biopsy 10/18/23  4. Lentigo, macular seborrheic keratosis, L lateral upper thigh. s/p biopsy 10/18/23  5. Actinic keratosis     -Face, treated most recently with Efudex plus calcipotriene fall 2022.  _______________________________________________________________    CC: RECHECK      Subjective: Dior Conde is a 78 year old female who presents today for Mohs micrographic surgery consultation for a recent diagnosis of skin cancer.    Spoke to patient's daughter Kati. Unable to reach patient.     - Skin cancer(s): BCC, nodular type  - Location(s): R glabella  - Skin cancer(s): BCC, superficial type  - Location(s): R lateral back  - no other concerns today         Objective:   Skin: Focused examination of the R glabella and R lateral back within the teledermatology photograph(s) on 11/19/23 was performed.   - R lateral back, superficial ulcer with brown yellow crust ~1cm  - R glabella, faint pink poorly defined papule ~1cm    Path report:   A(1). Skin, Right glabella: Basal cell carcinoma, nodular type  C(3). Skin, Right lateral back: Basal cell carcinoma, superficial type    Assessment and Plan:     1. BCC, R glabella, nodular type, s/p biopsy 10/18/23  Plan for Mohs micrographic surgery for skin cancer(s) above:  *Review lab result(s): Dermpath report   - We discussed the nature of the diagnosis/condition above. We discussed the treatment options, including the risks benefits  and expectations of these options. We recommend micrographic surgery as the most effective and most tissue sparing option for treatment, and the patient agrees to proceed with this.  The patient is aware of the risks, benefits and expectations of this procedure. The patient will be scheduled for this procedure, if not already done so.  - We anticipate the following closure type: Linear closure, such as complex linear closure (CLC)    2. BCC, R lateral back, superficial type, s/p Biopsy 10/18/23  - Discussed option of excision vs ED&C. Patient's daughter opted for treatment with less wound care.  - Plan for excision for skin cancer(s) above:  *Review lab result(s): Dermpath report   - We discussed the nature of the diagnosis/condition above. We discussed the treatment options, including the risks benefits and expectations of these options. We recommend excision as the most appropriate. The patient agrees to proceed with this.  The patient is aware of the risks, benefits and expectations of this procedure. The patient will be scheduled for this procedure, if not already done so.  - We anticipate the following closure type: Linear closure, such as complex linear closure (CLC)    The patient was discussed with and evaluated by attending physician, Manjinder Ward DO.    Scribe Disclosure:   I, Angelic Alvarenga, am serving as a scribe; to document services personally performed by Dr. Manjinder Ward - -based on data collection and the provider's statements to me.     Provider Disclosure:   The documentation recorded by the scribe accurately reflects the services I personally performed and the decisions made by me.    Manjinedr Ward DO    Department of Dermatology  [facility]: Phone: [phone], Fax:[phone]  [facility]: Phone: [phone], Fax: [phone]

## 2023-11-20 NOTE — NURSING NOTE
Teledermatology Nurse Call Patients:     Are you in the [facility] at the time of the encounter? yes    Today's visit will be billed to you and your insurance.    A teledermatology visit is not as thorough as an in-person visit and the quality of the photograph sent may not be of the same quality as that taken by the dermatology clinic.    Please conference daughter in.  Number is .      HIGH PHQ2

## 2023-11-20 NOTE — PATIENT INSTRUCTIONS
[facility] Dermatology Visit    Thank you for allowing us to participate in your care. Your findings, instructions and follow-up plan are as follows:         When should I call my doctor?  If you are worsening or not improving, please, contact us or seek urgent care as noted below.     Who should I call with questions (adults)?  [facility] (adult and pediatric): [phone]  [facility] (adult): [phone]  For urgent needs outside of business hours call the [facility] at [phone] and ask for the dermatology resident on call  If this is a medical emergency and you are unable to reach an ER, Call 911    Who should I call with questions (pediatric)?  [facility]- Pediatric Dermatology  Dr. Sylvie Whitley, Dr. Chavo Espitia, Dr. Rand Valladares, Claudia Conrad, PA  Dr. Sammi Moore, Dr. Alla Tejada & Dr. Jamal Woodson  Non Urgent  Nurse Triage Line; [phone]- Abbie and Madeleine RN Care Coordinators   Antonia (/Complex ) [phone]    If you need a prescription refill, please contact your pharmacy. Refills are approved or denied by our physicians during normal business hours, Monday through Fridays  Per office policy, refills will not be granted if you have not been seen within the past year (or sooner depending on your child's condition).    Scheduling Information:  Pediatric Appointment Scheduling and Call Center [phone]  Radiology Scheduling- [phone]  Sedation Unit Scheduling- [phone]  Angie Scheduling- General [phone]; Pediatric Dermatology [phone]  Main  Services: [phone]  Honduran: [phone]  Malagasy: [phone]  Hmong/Jose/Guinean: [phone]  Preadmission Nursing Department Fax Number: [phone] (fax all pre-operative paperwork to this number)    For urgent matters arising during evenings, weekends, or  holidays that cannot wait for normal business hours please call [phone] and ask for the dermatology resident on call to be paged.

## 2023-11-27 ENCOUNTER — OFFICE VISIT (OUTPATIENT)
Dept: DERMATOLOGY | Facility: CLINIC | Age: 78
End: 2023-11-27
Payer: COMMERCIAL

## 2023-11-27 VITALS — SYSTOLIC BLOOD PRESSURE: 128 MMHG | DIASTOLIC BLOOD PRESSURE: 78 MMHG | OXYGEN SATURATION: 96 % | HEART RATE: 68 BPM

## 2023-11-27 DIAGNOSIS — C44.519 BASAL CELL CARCINOMA (BCC) OF SKIN OF OTHER PART OF TORSO: ICD-10-CM

## 2023-11-27 DIAGNOSIS — C44.319 BASAL CELL CARCINOMA (BCC) OF GLABELLA: Primary | ICD-10-CM

## 2023-11-27 PROCEDURE — 11603 EXC TR-EXT MAL+MARG 2.1-3 CM: CPT | Mod: 59 | Performed by: DERMATOLOGY

## 2023-11-27 PROCEDURE — 17311 MOHS 1 STAGE H/N/HF/G: CPT | Performed by: DERMATOLOGY

## 2023-11-27 PROCEDURE — 12052 INTMD RPR FACE/MM 2.6-5.0 CM: CPT | Mod: 59 | Performed by: DERMATOLOGY

## 2023-11-27 PROCEDURE — 88305 TISSUE EXAM BY PATHOLOGIST: CPT | Mod: XE | Performed by: PATHOLOGY

## 2023-11-27 PROCEDURE — 12032 INTMD RPR S/A/T/EXT 2.6-7.5: CPT | Performed by: DERMATOLOGY

## 2023-11-27 NOTE — LETTER
11/27/2023         RE: Dior Conde  [address]  [facility] [address]        Dear Colleague,    Thank you for referring your patient, Dior Conde, to the [facility]. Please see a copy of my visit note below.    [facility] [place] Procedure Note    Dermatology Problem List:  Last FBSC performed on 10/18/23    NMSC  -  BCC, R glabella, s/p Mohs 11/27/23  -  BCC, R lateral back, superficial type, s/p excision 11/27/23    2. Intradermal melanocytic nevus, R upper cheek, s/p Biopsy 10/18/23  3. Lentigo, macular seborrheic keratosis, L lateral upper thigh. s/p biopsy 10/18/23  4. Actinic keratosis     -Face, treated most recently with Efudex plus calcipotriene fall 2022.  _______________________________________________________    Date of Service:  Nov 27, 2023  Surgery: Mohs micrographic surgery    Case 1  Repair Type: intermediate  Repair Size: 3.3 cm  Suture Material: Fast Absorbing Gut 5-0  Tumor Type: BCC - Basal cell carcinoma; (nodular)  Location: R glabella nodular  Derm-Path Accession #: [id number]  PreOp Size: 0.9x0.8 cm  PostOp Size: 1.4x1.4 cm  Mohs Accession #: [id number]  Level of Defect: fat      Procedure:  We discussed the principles of treatment and most likely complications including scarring, bleeding, infection, swelling, pain, crusting, nerve damage, large wound,  incomplete excision, wound dehiscence,  nerve damage, recurrence, and a second procedure may be recommended to obtain the best cosmetic or functional result.    Informed consent was obtained and the patient underwent the procedure as follows:  The patient was placed supine on the operating table.  The cancer was identified, outlined with a marker, and verified by the patient.  The entire surgical field was prepped with Hibiclens.  The surgical site was anesthetized using Lidocaine 1% with epi 1:100,000.      The area of clinically apparent tumor was debulked. The layer  of tissue was then surgically excised using a #15 blade and was then transferred onto a specimen sheet maintaining the orientation of the specimen. Hemostasis was obtained using bipolar electrocoagulation. The wound site was then covered with a dressing while the tissue samples were processed for examination.    The excised tissue was transported to the [facility] histology laboratory maintaining the tissue orientation.  The tissue specimen was relaxed so that the entire surgical margin was in a a single horizontal plane for sectioning and inked for precise mapping.  A precise reference map was drawn to reflect the sectioning of the specimen, colored inking of the margins, and orientation on the patient. The tissue was processed using horizontal sectioning of the base and continuous peripheral margins.  The histopathologic sections were reviewed in conjunction with the reference map.    Total blocks: 1    Total slides:  1    There were no cancer cells visualized on examination, therefore Mohs surgery was complete.    Reconstruction: Intermediate Linear Closure      The patient was taken to the operative suite and placed supine on the operating room table.  The defect was identified.  Appropriate markings were made with a marking pen to plan the repair.  The area was infiltrated with Lidocaine 1% with epi 1:100,000 and prepped with Hibiclens and draped with sterile towels.     The wound was debeveled and undermined widely.  Cones were excised within relaxed skin tension lines on both sides of the defect.  Hemostasis was obtained using electrofulguration.  Deep subcutaneous tissues were then approximated using 5-0 Monocrcyl buried vertical mattress sutures.  The wound edges were then approximated additional  buried sutures were placed in a similar fashion where needed.Simple running 5-0 FAG sutures were carefully placed for maximum eversion and meticulous approximation.    Repair Size: 3.3 cm    The wound was cleansed with  saline and ointment was applied along the wound surface.     A sterile pressure dressing was applied.  Wound care instructions were given verbally and in writing.  The patient left the operating suite in stable condition.  Patient was informed that additional refinement of the resulting surgical scar may be used as a second stage of this reconstruction.     DERMATOLOGY EXCISION PROCEDURE NOTE    Case 2  NAME OF PROCEDURE: Excision intermediate layered linear closure  Staff surgeon: Manjinder Ward DO  Resident: n/a  Scrub Nurse: GLADYS Hodges    PRE-OPERATIVE DIAGNOSIS:  Basal Cell Carcinoma  POST-OPERATIVE DIAGNOSIS: Same   LOCATION: right lateral back  FINAL EXCISION SIZE (DEFECT SIZE): 2.3x1.8 cm  MARGIN: 0.4 cm  FINAL REPAIR LENGTH: 5.0 cm   ANESTHESIA: 7mL 1% lidocaine with 1:100,000 epinephrine    INDICATIONS: This patient presented with a 1.5x1.0cm Basal Cell Carcinoma. Excision was indicated. We discussed the principles of treatment and most likely complications including scarring, bleeding, infection, incomplete excision, wound dehiscence, pain, nerve damage, and recurrence. Informed consent was obtained and the patient underwent the procedure as follows:    PROCEDURE: The patient was taken to the operative suite. Time-out was performed.  The treatment area was anesthetized with 1% lidocaine with epinephrine. The area was prepped with Chlorhexidine and rinsed with sterile saline and draped with sterile towels. The lesion was delineated and excised down to subcutaneous fat in a elliptical manner. Hemostasis was obtained by electrocoagulation.     REPAIR: An intermediate layered linear closure was selected as the procedure which would maximally preserve both function and cosmesis.    After the excision of the tumor, the area was carefully undermined. Hemostasis was obtained with bipolar electrocoagulation.  Closure was oriented so that the wound was in the patient's natural skin tension lines. The deep  subcutaneous and dermal layers were then closed with 4-0 monocryl sutures. The epidermis was then carefully approximated along the length of the wound using 4-0 monocryl running subcuticular sutures.     Estimated blood loss was less than 10 ml for all surgical sites. A sterile pressure dressing was applied and wound care instructions, with a written handout, were given. The patient was discharged from the [facility] alert and ambulatory.    The patient elected for pathology results to automatically release and understands that the clinical staff will contact them as soon as possible to notify them of the results.    Follow-up in PRN    Anatomic Pathology Results: pending    Clinical Follow-Up: Gen derm for skin cancer screening as scheduled    Staff Involved:  Staff & Scribe    The attending surgeon was present for entire procedure and always immediately available.    Scribe Disclosure:   I, Angelic Alvarenga, am serving as a scribe; to document services personally performed by Dr. Manjinder Ward - -based on data collection and the provider's statements to me.     Provider Disclosure:   The documentation recorded by the scribe accurately reflects the services I personally performed and the decisions made by me. I personally performed the procedures today.      Manjinder Ward DO    Department of Dermatology  [facility]: Phone: [phone], Fax:[phone]  [facility]: Phone: [phone], Fax: [phone]    Care and Laboratory Testing Performed at:  [facility]   Dermatology Clinic  [address] [place] [address]      Again, thank you for allowing me to participate in the care of your patient.        Sincerely,        Manjinder Ward MD

## 2023-11-27 NOTE — NURSING NOTE
Dior Conde's goals for this visit include:   Chief Complaint   Patient presents with    Procedure     2 sites. Mohs x 1 - glabella & excision right lateral back       She requests these members of her care team be copied on today's visit information:     PCP: Tyler Brothers    Referring Provider:  No referring provider defined for this encounter.    /78   Pulse 68   SpO2 96%     Do you need any medication refills at today's visit?       Yumi Velasco EMT

## 2023-11-27 NOTE — PROGRESS NOTES
[facility] Dermatologic Surgery Clinic [place] Procedure Note    Dermatology Problem List:  Last FBSC performed on 10/18/23    NMSC  -  BCC, R glabella, s/p Mohs 11/27/23  -  BCC, R lateral back, superficial type, s/p excision 11/27/23    2. Intradermal melanocytic nevus, R upper cheek, s/p Biopsy 10/18/23  3. Lentigo, macular seborrheic keratosis, L lateral upper thigh. s/p biopsy 10/18/23  4. Actinic keratosis     -Face, treated most recently with Efudex plus calcipotriene fall 2022.  _______________________________________________________    Date of Service:  Nov 27, 2023  Surgery: Mohs micrographic surgery    Case 1  Repair Type: intermediate  Repair Size: 3.3 cm  Suture Material: Fast Absorbing Gut 5-0  Tumor Type: BCC - Basal cell carcinoma; (nodular)  Location: R glabella nodular  Derm-Path Accession #: [id number]  PreOp Size: 0.9x0.8 cm  PostOp Size: 1.4x1.4 cm  Mohs Accession #: [id number]  Level of Defect: fat      Procedure:  We discussed the principles of treatment and most likely complications including scarring, bleeding, infection, swelling, pain, crusting, nerve damage, large wound,  incomplete excision, wound dehiscence,  nerve damage, recurrence, and a second procedure may be recommended to obtain the best cosmetic or functional result.    Informed consent was obtained and the patient underwent the procedure as follows:  The patient was placed supine on the operating table.  The cancer was identified, outlined with a marker, and verified by the patient.  The entire surgical field was prepped with Hibiclens.  The surgical site was anesthetized using Lidocaine 1% with epi 1:100,000.      The area of clinically apparent tumor was debulked. The layer of tissue was then surgically excised using a #15 blade and was then transferred onto a specimen sheet maintaining the orientation of the specimen. Hemostasis was obtained using bipolar electrocoagulation. The wound site was then covered with a  dressing while the tissue samples were processed for examination.    The excised tissue was transported to the Mohs histology laboratory maintaining the tissue orientation.  The tissue specimen was relaxed so that the entire surgical margin was in a a single horizontal plane for sectioning and inked for precise mapping.  A precise reference map was drawn to reflect the sectioning of the specimen, colored inking of the margins, and orientation on the patient. The tissue was processed using horizontal sectioning of the base and continuous peripheral margins.  The histopathologic sections were reviewed in conjunction with the reference map.    Total blocks: 1    Total slides:  1    There were no cancer cells visualized on examination, therefore Mohs surgery was complete.    Reconstruction: Intermediate Linear Closure      The patient was taken to the operative suite and placed supine on the operating room table.  The defect was identified.  Appropriate markings were made with a marking pen to plan the repair.  The area was infiltrated with Lidocaine 1% with epi 1:100,000 and prepped with Hibiclens and draped with sterile towels.     The wound was debeveled and undermined widely.  Cones were excised within relaxed skin tension lines on both sides of the defect.  Hemostasis was obtained using electrofulguration.  Deep subcutaneous tissues were then approximated using 5-0 Monocrcyl buried vertical mattress sutures.  The wound edges were then approximated additional  buried sutures were placed in a similar fashion where needed.Simple running 5-0 FAG sutures were carefully placed for maximum eversion and meticulous approximation.    Repair Size: 3.3 cm    The wound was cleansed with saline and ointment was applied along the wound surface.     A sterile pressure dressing was applied.  Wound care instructions were given verbally and in writing.  The patient left the operating suite in stable condition.  Patient was informed  that additional refinement of the resulting surgical scar may be used as a second stage of this reconstruction.     DERMATOLOGY EXCISION PROCEDURE NOTE    Case 2  NAME OF PROCEDURE: Excision intermediate layered linear closure  Staff surgeon: Manjinder Ward DO  Resident: n/a  Scrub Nurse: GLADYS Hodges    PRE-OPERATIVE DIAGNOSIS:  Basal Cell Carcinoma  POST-OPERATIVE DIAGNOSIS: Same   LOCATION: right lateral back  FINAL EXCISION SIZE (DEFECT SIZE): 2.3x1.8 cm  MARGIN: 0.4 cm  FINAL REPAIR LENGTH: 5.0 cm   ANESTHESIA: 7mL 1% lidocaine with 1:100,000 epinephrine    INDICATIONS: This patient presented with a 1.5x1.0cm Basal Cell Carcinoma. Excision was indicated. We discussed the principles of treatment and most likely complications including scarring, bleeding, infection, incomplete excision, wound dehiscence, pain, nerve damage, and recurrence. Informed consent was obtained and the patient underwent the procedure as follows:    PROCEDURE: The patient was taken to the operative suite. Time-out was performed.  The treatment area was anesthetized with 1% lidocaine with epinephrine. The area was prepped with Chlorhexidine and rinsed with sterile saline and draped with sterile towels. The lesion was delineated and excised down to subcutaneous fat in a elliptical manner. Hemostasis was obtained by electrocoagulation.     REPAIR: An intermediate layered linear closure was selected as the procedure which would maximally preserve both function and cosmesis.    After the excision of the tumor, the area was carefully undermined. Hemostasis was obtained with bipolar electrocoagulation.  Closure was oriented so that the wound was in the patient's natural skin tension lines. The deep subcutaneous and dermal layers were then closed with 4-0 monocryl sutures. The epidermis was then carefully approximated along the length of the wound using 4-0 monocryl running subcuticular sutures.     Estimated blood loss was less than 10 ml for  all surgical sites. A sterile pressure dressing was applied and wound care instructions, with a written handout, were given. The patient was discharged from the [facility] Center alert and ambulatory.    The patient elected for pathology results to automatically release and understands that the clinical staff will contact them as soon as possible to notify them of the results.    Follow-up in PRN    Anatomic Pathology Results: pending    Clinical Follow-Up: Gen derm for skin cancer screening as scheduled    Staff Involved:  Staff & Scribe    The attending surgeon was present for entire procedure and always immediately available.    Scribe Disclosure:   I, Angelic Alvarenga, am serving as a scribe; to document services personally performed by Dr. Manjinder Ward - -based on data collection and the provider's statements to me.     Provider Disclosure:   The documentation recorded by the scribe accurately reflects the services I personally performed and the decisions made by me. I personally performed the procedures today.      Manjinder Ward DO    Department of Dermatology  [facility]: Phone: [phone], Fax:[phone]  [facility]: Phone: [phone], Fax: [phone]    Care and Laboratory Testing Performed at:  [facility]   Dermatology Clinic  [address] [place] [address]

## 2023-11-27 NOTE — PATIENT INSTRUCTIONS
Forehead:  Mohs Wound Care Instructions  I will experience scar, altered skin color, bleeding, swelling, pain, crusting and redness. I may experience altered sensation. Risks are excessive bleeding, infection, muscle weakness, thick (hypertrophic or keloidal) scar, and recurrence. A second procedure may be recommended to obtain the best cosmetic or functional result.  Possible complications of any surgical procedure are bleeding, infection, scarring, alteration in skin color and sensation, muscle weakness in the area, wound dehiscence or seperation, or recurrence of the lesion or disease. On occasion, after healing, a secondary procedure or revision may be recommended in order to obtain the best cosmetic or functional result.   After your surgery, a pressure bandage will be placed over the area that has sutures. This will help prevent bleeding. Please follow these instructions as they will help you to prevent complications as your wound heals.  For the First 48 hours After Surgery:  Leave the pressure bandage on and keep it dry. If it should come loose, you may retape it, but do not take it off.  Relax and take it easy. Do not do any vigorous exercise, heavy lifting, or bending forward. This could cause the wound to bleed.  Post-operative pain is usually mild. You may alternate between 1000 mg of Tylenol (acetaminophen) and 400 mg of Ibuprofen every 4 hours.  Do not take more than 4,000mg of acetaminophen in a 24 hour period or 3200 mg of Ibuprofen in a 24 hr period.  Avoid alcohol and vitamin E as these may increase your tendency to bleed.  You may put an ice pack around the bandaged area for 20 minutes every 2-3 hours. This may help reduce swelling, bruising, and pain. Make sure the ice pack is waterproof so that the pressure bandage does not get wet.   You may see a small amount of drainage or blood on your pressure bandage. This is normal. However, if drainage or bleeding continues or saturates the bandage, you  will need to apply firm pressure over the bandage with a washcloth for 15 minutes. If bleeding continues after applying pressure for 15 minutes then go to the nearest emergency room.  48 Hours After Surgery  Carefully remove the bandage and start daily wound care and dressing changes. You may also now shower and get the wound wet.  Daily Wound Care:  Wash wound with a mild soap and water.  Use caution when washing the wound, be gentle and do not let the forceful shower stream hit the wound directly.  Pat dry.  Apply Vaseline (from a new container or tube) over the suture line with a Q-tip. It is very important to keep the wound continuously moist, as wounds heal best in a moist environment.  Keep the site covered until sutures have dissolved.  You can cover it with a Telfa (non-stick) dressing and tape or a band-aid.    If you are unable to keep wound covered, you must apply Vaseline every 2-3 hours (while awake) to ensure it is being kept moist for optimal healing. A dressing overnight is recommended to keep the area moist.  Call Us If:  You have pain that is not controlled with Tylenol/Ibuprofen  You have signs or symptoms of an infection, such as: fever over 100 degrees F, redness, warmth, or foul-smelling or yellow drainage from the wound.  Who should I call with questions?  [facility]: [phone]   [facility]: [phone]  For urgent needs outside of business hours call the [facility] at [phone] and ask to speak with the dermatology resident on call       Back: Excision Wound Care Instructions with Steri-Strips    Possible complications of any surgical procedure are bleeding, infection, scarring, alteration in skin color and sensation, muscle weakness in the area, wound dehiscence or seperation, or recurrence of the lesion or disease. On occasion, after healing, a secondary procedure or revision may be recommended in order to obtain  the best cosmetic or functional result.     After your surgery, a pressure bandage will be placed over the area that has sutures. This will help prevent bleeding. Please, follow these instructions as they will help you to prevent complications as your wound heals.    For the First 48 hours After Surgery:    Leave the pressure bandage on and keep it dry. If it should come loose, you may retape it, but do not take it off.    Relax and take it easy. Do not do any vigorous exercise, heavy lifting, or bending forward. This could cause the wound to bleed.    Post-operative pain is usually mild. You may alternate between 1000 mg of Tylenol (acetaminophen) and 400 mg of Ibuprofen every 4 hours.  Do not take more than 4,000 mg of acetaminophen and more than 3200 mg of Ibuprofen in a 24 hr period.  Avoid alcohol and vitamin E as these may increase your tendency to bleed.    You may put an ice pack around the bandaged area for 20 minutes every 2-3 hours. This may help reduce swelling, bruising, and pain. Make sure the ice pack is waterproof so that the pressure bandage does not get wet.     If your bandage is saturated with blood apply firm pressure over the bandage with a washcloth for 15 minutes. If bleeding continues after applying pressure for 15 minutes then go to the nearest emergency room.      48 Hours After Surgery:    Remove outer white bandage down to clear plastic film (Tegaderm).  You may notice dark brown, dried blood under the Tegaderm.  This is normal.    Leave the clear plastic film (Tegaderm) on for up to 2 weeks, as long as it is intact.  If it falls off prior to 2 weeks follow daily wound care below.  If it stays intact for the full 2 weeks, then remove and treat as normal, healthy skin.      Daily Wound Care (if Tegaderm and Steri-Strips fall off prior to 2 weeks):    Wash wound with a mild soap and water.  Use caution when washing the wound, be gentle and do not let the forceful shower stream hit the  wound directly. DO NOT WASH WITH HYDROGEN PEROXIDE AS THIS MIGHT CAUSE THE STITCHES TO DISSOLVE FASTER THAN WHAT WE WANT.    Pat dry.    Apply Vaseline (from a new container or tube) over the suture line with a Q-tip until it is completely healed. It is very important to keep the wound continuously moist, as wounds heal best in a moist environment.    Keep the site covered until it's healed.  You can cover it with a Telfa (non-stick) dressing and tape or a band-aid until healed with normal, healthy skin.      Call Us If:    You have pain that is not controlled with Tylenol/Ibuprofen.    You have signs or symptoms of an infection, such as: fever over 100 degrees F, redness, warmth, or foul-smelling or yellow/creamy drainage from the wound.      Who should I call with questions?  [facility]: [phone]  [facility]: [phone]  For urgent needs outside of business hours call the [facility] at [phone] and ask for the dermatology resident on call

## 2023-11-28 ENCOUNTER — TELEPHONE (OUTPATIENT)
Dept: DERMATOLOGY | Facility: CLINIC | Age: 78
End: 2023-11-28
Payer: COMMERCIAL

## 2023-11-28 NOTE — TELEPHONE ENCOUNTER
Dior is 1 day s/p Mohs to glabella and excision of a BCC on the right back.  I called to follow up on how she is doing post-procedure.  I left a detailed message requesting a call back if the patient had any questions or concerns.    Post op appt: pt declined  Next skin exam: scheduled    Ramona Randolph RN

## 2023-11-29 LAB
PATH REPORT.COMMENTS IMP SPEC: NORMAL
PATH REPORT.COMMENTS IMP SPEC: NORMAL
PATH REPORT.FINAL DX SPEC: NORMAL
PATH REPORT.GROSS SPEC: NORMAL
PATH REPORT.MICROSCOPIC SPEC OTHER STN: NORMAL
PATH REPORT.RELEVANT HX SPEC: NORMAL

## 2024-01-02 DIAGNOSIS — J43.2 CENTRILOBULAR EMPHYSEMA (H): ICD-10-CM

## 2024-01-02 NOTE — TELEPHONE ENCOUNTER
umeclidinium-vilanterol (ANORO ELLIPTA) 62.5-25 MCG/ACT oral inhaler Inhale 1 puff into the lungs daily 2 each 3 ordered 08/22/2023     Last Office Visit: 10/18/2023  Future Office visit: 04/15/2024     Franklin Flores LPN  Pulmonary Medicine:  [facility]  Phone: [phone] Fax: [phone]

## 2024-01-03 DIAGNOSIS — J43.2 CENTRILOBULAR EMPHYSEMA (H): ICD-10-CM

## 2024-01-03 NOTE — TELEPHONE ENCOUNTER
Patient is requesting that remaining Rx refills for umeclidinium-vilanterol (ANORO ELLIPTA) 62.5-25 MCG/ACT oral inhaler be sent to the [facility]. Rx's sent in per pt request.    Franklin Flores LPN  Pulmonary Medicine:  [facility]  Phone: [phone] Fax: [phone]

## 2024-01-07 ENCOUNTER — HEALTH MAINTENANCE LETTER (OUTPATIENT)
Age: 79
End: 2024-01-07

## 2024-04-15 ENCOUNTER — OFFICE VISIT (OUTPATIENT)
Dept: PULMONOLOGY | Facility: CLINIC | Age: 79
End: 2024-04-15
Payer: COMMERCIAL

## 2024-04-15 VITALS
DIASTOLIC BLOOD PRESSURE: 72 MMHG | SYSTOLIC BLOOD PRESSURE: 132 MMHG | BODY MASS INDEX: 28.92 KG/M2 | WEIGHT: 153 LBS | HEART RATE: 65 BPM | OXYGEN SATURATION: 94 %

## 2024-04-15 DIAGNOSIS — R06.09 DOE (DYSPNEA ON EXERTION): Primary | ICD-10-CM

## 2024-04-15 DIAGNOSIS — J20.9 ACUTE BRONCHITIS WITH SYMPTOMS > 10 DAYS: ICD-10-CM

## 2024-04-15 DIAGNOSIS — J43.2 CENTRILOBULAR EMPHYSEMA (H): ICD-10-CM

## 2024-04-15 PROCEDURE — 99215 OFFICE O/P EST HI 40 MIN: CPT | Performed by: INTERNAL MEDICINE

## 2024-04-15 RX ORDER — FLUTICASONE FUROATE, UMECLIDINIUM BROMIDE AND VILANTEROL TRIFENATATE 100; 62.5; 25 UG/1; UG/1; UG/1
1 POWDER RESPIRATORY (INHALATION) DAILY
Qty: 3 EACH | Refills: 3 | Status: SHIPPED | OUTPATIENT
Start: 2024-04-15 | End: 2024-08-14

## 2024-04-15 RX ORDER — ALBUTEROL SULFATE 90 UG/1
2 AEROSOL, METERED RESPIRATORY (INHALATION) 4 TIMES DAILY
Qty: 54 G | Refills: 3 | Status: SHIPPED | OUTPATIENT
Start: 2024-04-15

## 2024-04-15 ASSESSMENT — PAIN SCALES - GENERAL: PAINLEVEL: NO PAIN (0)

## 2024-04-15 NOTE — PROGRESS NOTES
Pulmonary Clinic Return Patient Visit  Reason for Visit: COPD  History of Present Illness  Dior Conde is a pleasant 79-year-old female who presents to clinic for follow up of COPD and is accompanied by her daughter Kati who works here at [place]. I last saw her in clinic in 10/2023.  To briefly review, Dior was diagnosed with COPD several years ago.  She was being seen by an outside physician and her COPD is managed with Anoro.  I escalated her regimen to triple inhaler- Trelegy with some improved control however she was unable to tolerate it due to thrush. She did notice improved symptoms with Trelegy compared to Anoro.   Today, she has been more symptomatic lately with increased dependence on albuterol rescue inhaler. She is more sedentary but has been working on increasing her activities by taking short walks with her small dog. She was treated by her PCP with a burst of prednisone and Cefdinir for increased cough and wheezing which was very helpful. She had also fallen and had a dislocated shoulder for which she was seen at the ED.   She denies any cough, wheezing, chest pain nor pedal swellings.  Former smoker, quit , 1ppd x 40yrs. She worked as a [employer] for several years and she is retired. 2 uncles  from lung cancer in their 50's and has another uncle with copd.    Review of Systems:  10 of 14 systems reviewed and are negative unless otherwise stated in HPI.    Past Medical History:   Diagnosis Date    Bleeding ulcer     Breast cyst     13 years old       Past Surgical History:   Procedure Laterality Date    HERNIA REPAIR         No family history on file.    Social History     Socioeconomic History    Marital status:      Spouse name: None    Number of children: None    Years of education: None    Highest education level: None   Occupational History    None   Tobacco Use    Smoking status: Former Smoker     Packs/day: 1.00     Years: 40.00     Pack years: 40.00     Types: Cigarettes      Quit date: 2000     Years since quittin.4    Smokeless tobacco: Never Used   Substance and Sexual Activity    Alcohol use: No    Drug use: No    Sexual activity: None   Other Topics Concern    None   Social History Narrative    None     Social Determinants of Health     Financial Resource Strain: Not on file   Food Insecurity: Not on file   Transportation Needs: Not on file   Physical Activity: Not on file   Stress: Not on file   Social Connections: Not on file   Intimate Partner Violence: Not on file   Housing Stability: Not on file         Allergies   Allergen Reactions    Nkda [No Known Drug Allergy]          Current Outpatient Medications:     albuterol (PROAIR HFA/PROVENTIL HFA/VENTOLIN HFA) 108 (90 Base) MCG/ACT inhaler, Inhale 2 puffs into the lungs 4 times daily, Disp: 54 g, Rfl: 3    albuterol (PROVENTIL) (2.5 MG/3ML) 0.083% neb solution, Take 1 vial (2.5 mg) by nebulization every 6 hours as needed for shortness of breath / dyspnea or wheezing, Disp: 90 mL, Rfl: 11    atorvastatin (LIPITOR) 20 MG tablet, , Disp: , Rfl:     buPROPion (WELLBUTRIN XL) 150 MG 24 hr tablet, Take 1 tablet by mouth daily, Disp: , Rfl:     citalopram (CELEXA) 20 MG tablet, Take 20 mg by mouth daily, Disp: , Rfl:     omeprazole (PRILOSEC) 20 MG DR capsule, , Disp: , Rfl:     umeclidinium-vilanterol (ANORO ELLIPTA) 62.5-25 MCG/ACT oral inhaler, Inhale 1 puff into the lungs daily, Disp: 3 each, Rfl: 2    donepezil (ARICEPT) 5 MG tablet, Take 1 tablet by mouth daily (Patient not taking: Reported on 4/15/2024), Disp: , Rfl:       Physical Exam:  /72 (BP Location: Left arm, Patient Position: Sitting, Cuff Size: Adult Regular)   Pulse 65   Wt 69.4 kg (153 lb)   SpO2 94%   BMI 28.92 kg/m    GENERAL: Well developed, well nourished, alert, and in no apparent distress.  HEENT: Normocephalic, atraumatic. PERRL, EOMI. Oral mucosa is moist. No perioral cyanosis.  NECK: supple, no masses, no thyromegaly.  RESP:  Normal  "respiratory effort.  CTAB.  No rales, wheezes, rhonchi.  No cyanosis or clubbing.  CV: Normal S1, S2, regular rhythm, normal rate. No murmur.  No LE edema.   ABDOMEN:  Soft, non-tender, non-distended.   SKIN: warm and dry. No rash.  NEURO: AAOx3.  Normal gait.  Fluent speech.  PSYCH: mentation appears normal.     Results:  PFTs: Reviewed and discussed with patient-mild obstruction with significant hyperinflation and air trapping.  Relatively preserved diffusion capacity  Most Recent [facility] Pulmonary Function Testing    FVC-Pred   Date Value Ref Range Status   09/14/2022 2.33 L      FVC-Pre   Date Value Ref Range Status   09/14/2022 2.02 L      FVC-%Pred-Pre   Date Value Ref Range Status   09/14/2022 86 %      FEV1-Pre   Date Value Ref Range Status   09/14/2022 1.12 L      FEV1-%Pred-Pre   Date Value Ref Range Status   09/14/2022 62 %      FEV1FVC-Pred   Date Value Ref Range Status   09/14/2022 78 %      FEV1FVC-Pre   Date Value Ref Range Status   09/14/2022 56 %      No results found for: \"20029\"  FEFMax-Pred   Date Value Ref Range Status   09/14/2022 4.53 L/sec      FEFMax-Pre   Date Value Ref Range Status   09/14/2022 3.76 L/sec      FEFMax-%Pred-Pre   Date Value Ref Range Status   09/14/2022 83 %      ExpTime-Pre   Date Value Ref Range Status   09/14/2022 10.94 sec      FIFMax-Pre   Date Value Ref Range Status   09/14/2022 4.03 L/sec      FEV1FEV6-Pred   Date Value Ref Range Status   09/14/2022 78 %      FEV1FEV6-Pre   Date Value Ref Range Status   09/14/2022 58 %      No results found for: \"20055\"  Imaging (personally reviewed in clinic today): CXR 3/10/2022  Impression:   1. No focal airspace disease.  2. Stable appearing high lung volumes with flattening of the hemidiaphragms which can be seen in patients with chronic obstructive pulmonary disease.  3. Stable appearing streaky bibasilar opacities which may represent atelectasis versus scarring.    Assessment and Plan:   COPD- Group B  She appears to be more " symptomatic while on Anoro and I suspect that there is an element of deconditioning. She was recently seen for a fall and shoulder dislocation. I will escalate her regimen to low dose Trelegy as she appeared to have benefited from this in the past. She will continue to rinse her mouth after each use to prevent thrush which has been an issues in the past. I will also refer her to pulmonary rehab to help with deconditioning. I also encouraged her to use her albuterol prior to chores at home.     Questions and concerns were answered to the patient's satisfaction.  she was provided with my contact information should new questions or concerns arise in the interim.  she should return to clinic in 6 months   Up to date on vaccination  I spent a total of 40 minutes face to face with Dior Conde during today's office visit. Over 50% of this time was spent counseling the patient and/or coordinating care regarding their pulmonary disease.    Donna Webb MD  Pulmonary, Critical Care and Sleep Medicine  [facility]  Pager: [phone]      The above note was dictated using voice recognition software and may include typographical errors. Please contact the author for any clarifications.

## 2024-04-16 ENCOUNTER — TELEPHONE (OUTPATIENT)
Dept: PULMONOLOGY | Facility: CLINIC | Age: 79
End: 2024-04-16
Payer: COMMERCIAL

## 2024-04-16 NOTE — TELEPHONE ENCOUNTER
Pulmonary rehab referral has been faxed to [facility] at F: [phone] per patient request.    Franklin Flores LPN  Pulmonary Medicine:  [facility]  Phone: [phone] Fax: [phone]

## 2024-04-17 ENCOUNTER — OFFICE VISIT (OUTPATIENT)
Dept: DERMATOLOGY | Facility: CLINIC | Age: 79
End: 2024-04-17
Payer: COMMERCIAL

## 2024-04-17 DIAGNOSIS — D18.01 CHERRY ANGIOMA: ICD-10-CM

## 2024-04-17 DIAGNOSIS — D22.9 MULTIPLE BENIGN NEVI: Primary | ICD-10-CM

## 2024-04-17 DIAGNOSIS — L81.4 LENTIGINES: ICD-10-CM

## 2024-04-17 DIAGNOSIS — Z85.828 HISTORY OF NONMELANOMA SKIN CANCER: ICD-10-CM

## 2024-04-17 DIAGNOSIS — D48.9 NEOPLASM OF UNCERTAIN BEHAVIOR: ICD-10-CM

## 2024-04-17 DIAGNOSIS — L82.1 SEBORRHEIC KERATOSES: ICD-10-CM

## 2024-04-17 PROBLEM — C44.519 BASAL CELL CARCINOMA (BCC) OF SKIN OF OTHER PART OF TORSO: Status: ACTIVE | Noted: 2024-04-17

## 2024-04-17 PROBLEM — C44.519 BASAL CELL CARCINOMA (BCC) OF BACK: Status: ACTIVE | Noted: 2024-04-17

## 2024-04-17 PROBLEM — C44.319: Status: ACTIVE | Noted: 2024-04-17

## 2024-04-17 PROCEDURE — 88305 TISSUE EXAM BY PATHOLOGIST: CPT | Performed by: DERMATOLOGY

## 2024-04-17 PROCEDURE — 11102 TANGNTL BX SKIN SINGLE LES: CPT | Performed by: PHYSICIAN ASSISTANT

## 2024-04-17 PROCEDURE — 99213 OFFICE O/P EST LOW 20 MIN: CPT | Mod: 25 | Performed by: PHYSICIAN ASSISTANT

## 2024-04-17 NOTE — PROGRESS NOTES
The following medication was given:     MEDICATION:  Lidocaine with epinephrine 1% 1:[id number]  ROUTE: SQ  SITE: see procedure note  DOSE: 1mL  LOT #: [id number]  : [employer]  EXPIRATION DATE: 01-31-25  NDC#: 39690-867-65  Was there drug waste? no  Multi-dose vial: Yes    Tish Jordan LPN  April 17, 2024

## 2024-04-17 NOTE — LETTER
4/17/2024         RE: Dior Conde  [address]  [facility] [address]        Dear Colleague,    Thank you for referring your patient, Dior Conde, to the [facility]. Please see a copy of my visit note below.    [facility] Dermatology Note  Encounter Date: Apr 17, 2024  Office Visit      Dermatology Problem List:  Last FBSC performed on 4/17/24     #NUB, R forearm, S/p Biopsy performed on 4/17/24: Pending results.   NMSC  -  BCC, R glabella, s/p Mohs 11/27/23  -  BCC, R lateral back, superficial type, s/p excision 11/27/23  2. Benign biopsies  - Intradermal melanocytic nevus, R upper cheek, s/p Biopsy 10/18/23  - Lentigo, macular seborrheic keratosis, L lateral upper thigh. s/p biopsy 10/18/23  3. Actinic keratosis     -Face, treated most recently with Efudex plus calcipotriene fall 2022  ____________________________________________    Assessment & Plan:    # Neoplasm of unspecified behavior of the skin (D49.2) on the R forearm. The differential diagnosis includes neurofibroma vs epithelioma vs other. .   - Shave biopsy performed today, see procedure note below.   - Photographed today      # Hx of NMSC  - Sunscreen: Apply 20 minutes prior to going outdoors and reapply every two hours, when wet or sweating. We recommend using an SPF 30 or higher, and to use one that is water resistant.     - Advised to monitor for changing, non-healing, bleeding, painful, changing, or otherwise symptomatic lesions  - Continue bi annual skin exams    # Benign findings: multiple benign nevi, lentigines, cherry angiomas, SKs  - edu on benign etiology  - Signs and Symptoms of non-melanoma skin cancer and ABCDEs of melanoma reviewed with patient. Patient encouraged to perform monthly self skin exams and educated on how to perform them. UV precautions reviewed with patient. Patient was asked about new or changing moles/lesions on body.   - Sunscreen: Apply 20 minutes prior to going  outdoors and reapply every two hours, when wet or sweating. We recommend using an SPF 30 or higher, and to use one that is water resistant.     - RTC for changes      Procedures Performed:   - Shave biopsy procedure note, location(s): see above. After discussion of benefits and risks including but not limited to bleeding, infection, scar, incomplete removal, recurrence, and non-diagnostic biopsy, written consent and photographs were obtained. The area was cleaned with isopropyl alcohol. 0.5mL of 1% lidocaine with epinephrine was injected to obtain adequate anesthesia of lesion(s). Shave biopsy at site(s) performed. Hemostasis was achieved with aluminium chloride. Petrolatum ointment and a sterile dressing were applied. The patient tolerated the procedure and no complications were noted. The patient was provided with verbal and written post care instructions.      Follow-up: 1 year(s) in-person, or earlier for new or changing lesions    Staff and scribe:    Scribe Disclosure:   I, JOELLE MUJICA, am serving as a scribe; to document services personally performed by Carmela Cool PA-C -based on data collection and the provider's statements to me.     Provider Disclosure:  I agree with above History, Review of Systems, Physical exam and Plan.  I have reviewed the content of the documentation and have edited it as needed. I have personally performed the services documented here and the documentation accurately represents those services and the decisions I have made.      Electronically signed by:    All risks, benefits and alternatives were discussed with patient.  Patient is in agreement and understands the assessment and plan.  All questions were answered.    Carmela Cool PA-C, [facility]  [facility]: Phone: [phone], Fax: [phone]  [facility]: Phone: [phone],  Fax: [phone]  [facility]:  Phone: [phone], Fax: [phone]  ____________________________________________    CC: Skin Check (6 month skin check, hx NMSC. Right arm has a skin tag that she wants removed. Right shoulder- mid upper back.)      Reviewed patients past medical history and pertinent chart review prior to patient's visit today.     HPI:  Ms. Dior Conde is a 79 year old female who [place] today [place] [facility]     Today patient reports that she has a skin tag on her R arm that she wants to be removed.     Has a hx of NMSC.    Patient is otherwise feeling well, without additional concerns.    Labs:  N/A    Physical Exam:  Vitals: There were no vitals taken for this visit.  SKIN: Full skin, which includes the head/face, both arms, chest, back, abdomen,both legs, genitalia and/or groin buttocks, digits and/or nails, was examined.   - Tanner's skin type II, has <100 nevi  - There are dome shaped bright red papules on the trunk.   - Multiple regular brown pigmented macules and papules are identified on the trunk and extremities.   - Scattered brown macules on sun exposed areas.  - There are waxy stuck on tan to brown papules on the trunk.   - R forearm she has a 8 mm pedunculated flesh colored papule  - No other lesions of concern on areas examined.         Medications:  Current Outpatient Medications   Medication Sig Dispense Refill     albuterol (PROAIR HFA/PROVENTIL HFA/VENTOLIN HFA) 108 (90 Base) MCG/ACT inhaler Inhale 2 puffs into the lungs 4 times daily 54 g 3     albuterol (PROVENTIL) (2.5 MG/3ML) 0.083% neb solution Take 1 vial (2.5 mg) by nebulization every 6 hours as needed for shortness of breath / dyspnea or wheezing 90 mL 11     atorvastatin (LIPITOR) 20 MG tablet        buPROPion (WELLBUTRIN XL) 150 MG 24 hr tablet Take 1 tablet by mouth daily       citalopram (CELEXA) 20 MG tablet Take 20 mg by mouth daily       donepezil (ARICEPT) 5 MG tablet Take 1 tablet by mouth daily       omeprazole  (PRILOSEC) 20 MG DR capsule        umeclidinium-vilanterol (ANORO ELLIPTA) 62.5-25 MCG/ACT oral inhaler Inhale 1 puff into the lungs daily 3 each 2     Fluticasone-Umeclidin-Vilant (TRELEGY ELLIPTA) 100-62.5-25 MCG/ACT oral inhaler Inhale 1 puff into the lungs daily for 360 days (Patient not taking: Reported on 4/17/2024) 3 each 3     No current facility-administered medications for this visit.      Past Medical/Surgical History:   Patient Active Problem List   Diagnosis     Depression with anxiety     Diverticulosis of large intestine without hemorrhage     Examination of participant in clinical trial     Hypercholesteremia     Post-traumatic headache     Pulmonary emphysema, unspecified emphysema type (H)     Vitamin D deficiency     Hx of colonic polyps     Past Medical History:   Diagnosis Date     Bleeding ulcer      Breast cyst     13 years old                        The following medication was given:     MEDICATION:  Lidocaine with epinephrine 1% 1:[id number]  ROUTE: SQ  SITE: see procedure note  DOSE: 1mL  LOT #: [id number]  : [employer]  EXPIRATION DATE: 01-31-25  NDC#: 39366-478-59  Was there drug waste? no  Multi-dose vial: Yes    Tish Jordan LPN  April 17, 2024      Again, thank you for allowing me to participate in the care of your patient.        Sincerely,        Carmela Cool PA-C

## 2024-04-17 NOTE — PROGRESS NOTES
[facility] Dermatology Note  Encounter Date: Apr 17, 2024  Office Visit      Dermatology Problem List:  Last FBSC performed on 4/17/24     #NUB, R forearm, S/p Biopsy performed on 4/17/24: Pending results.   NMSC  -  BCC, R glabella, s/p Mohs 11/27/23  -  BCC, R lateral back, superficial type, s/p excision 11/27/23  2. Benign biopsies  - Intradermal melanocytic nevus, R upper cheek, s/p Biopsy 10/18/23  - Lentigo, macular seborrheic keratosis, L lateral upper thigh. s/p biopsy 10/18/23  3. Actinic keratosis     -Face, treated most recently with Efudex plus calcipotriene fall 2022  ____________________________________________    Assessment & Plan:    # Neoplasm of unspecified behavior of the skin (D49.2) on the R forearm. The differential diagnosis includes neurofibroma vs epithelioma vs other. .   - Shave biopsy performed today, see procedure note below.   - Photographed today      # Hx of NMSC  - Sunscreen: Apply 20 minutes prior to going outdoors and reapply every two hours, when wet or sweating. We recommend using an SPF 30 or higher, and to use one that is water resistant.     - Advised to monitor for changing, non-healing, bleeding, painful, changing, or otherwise symptomatic lesions  - Continue bi annual skin exams    # Benign findings: multiple benign nevi, lentigines, cherry angiomas, SKs  - edu on benign etiology  - Signs and Symptoms of non-melanoma skin cancer and ABCDEs of melanoma reviewed with patient. Patient encouraged to perform monthly self skin exams and educated on how to perform them. UV precautions reviewed with patient. Patient was asked about new or changing moles/lesions on body.   - Sunscreen: Apply 20 minutes prior to going outdoors and reapply every two hours, when wet or sweating. We recommend using an SPF 30 or higher, and to use one that is water resistant.     - RTC for changes      Procedures Performed:   - Shave biopsy procedure note, location(s): see above.  After discussion of benefits and risks including but not limited to bleeding, infection, scar, incomplete removal, recurrence, and non-diagnostic biopsy, written consent and photographs were obtained. The area was cleaned with isopropyl alcohol. 0.5mL of 1% lidocaine with epinephrine was injected to obtain adequate anesthesia of lesion(s). Shave biopsy at site(s) performed. Hemostasis was achieved with aluminium chloride. Petrolatum ointment and a sterile dressing were applied. The patient tolerated the procedure and no complications were noted. The patient was provided with verbal and written post care instructions.      Follow-up: 1 year(s) in-person, or earlier for new or changing lesions    Staff and scribe:    Scribe Disclosure:   I, JOELLE MUJICA, am serving as a scribe; to document services personally performed by Carmela Cool PA-C -based on data collection and the provider's statements to me.     Provider Disclosure:  I agree with above History, Review of Systems, Physical exam and Plan.  I have reviewed the content of the documentation and have edited it as needed. I have personally performed the services documented here and the documentation accurately represents those services and the decisions I have made.      Electronically signed by:    All risks, benefits and alternatives were discussed with patient.  Patient is in agreement and understands the assessment and plan.  All questions were answered.    Carmela Cool PA-C, MPAS  [facility]: Phone: [phone], Fax: [phone]  [facility]: Phone: [phone],  Fax: [phone]  [facility]: Phone: [phone], Fax: [phone]  ____________________________________________    CC: Skin Check (6 month skin check, hx NMSC. Right arm has a skin tag that she wants removed. Right shoulder- mid upper back.)      Reviewed patients past medical  history and pertinent chart review prior to patient's visit today.     HPI:  Ms. Dior Conde is a 79 year old female who [place] today [place] a [place] [facility] [place]     Today patient reports that she has a skin tag on her R arm that she wants to be removed.     Has a hx of NMSC.    Patient is otherwise feeling well, without additional concerns.    Labs:  N/A    Physical Exam:  Vitals: There were no vitals taken for this visit.  SKIN: Full skin, which includes the head/face, both arms, chest, back, abdomen,both legs, genitalia and/or groin buttocks, digits and/or nails, was examined.   - Tanner's skin type II, has <100 nevi  - There are dome shaped bright red papules on the trunk.   - Multiple regular brown pigmented macules and papules are identified on the trunk and extremities.   - Scattered brown macules on sun exposed areas.  - There are waxy stuck on tan to brown papules on the trunk.   - R forearm she has a 8 mm pedunculated flesh colored papule  - No other lesions of concern on areas examined.         Medications:  Current Outpatient Medications   Medication Sig Dispense Refill    albuterol (PROAIR HFA/PROVENTIL HFA/VENTOLIN HFA) 108 (90 Base) MCG/ACT inhaler Inhale 2 puffs into the lungs 4 times daily 54 g 3    albuterol (PROVENTIL) (2.5 MG/3ML) 0.083% neb solution Take 1 vial (2.5 mg) by nebulization every 6 hours as needed for shortness of breath / dyspnea or wheezing 90 mL 11    atorvastatin (LIPITOR) 20 MG tablet       buPROPion (WELLBUTRIN XL) 150 MG 24 hr tablet Take 1 tablet by mouth daily      citalopram (CELEXA) 20 MG tablet Take 20 mg by mouth daily      donepezil (ARICEPT) 5 MG tablet Take 1 tablet by mouth daily      omeprazole (PRILOSEC) 20 MG DR capsule       umeclidinium-vilanterol (ANORO ELLIPTA) 62.5-25 MCG/ACT oral inhaler Inhale 1 puff into the lungs daily 3 each 2    Fluticasone-Umeclidin-Vilant (TRELEGY ELLIPTA) 100-62.5-25 MCG/ACT oral inhaler Inhale 1 puff into the lungs  daily for 360 days (Patient not taking: Reported on 4/17/2024) 3 each 3     No current facility-administered medications for this visit.      Past Medical/Surgical History:   Patient Active Problem List   Diagnosis    Depression with anxiety    Diverticulosis of large intestine without hemorrhage    Examination of participant in clinical trial    Hypercholesteremia    Post-traumatic headache    Pulmonary emphysema, unspecified emphysema type (H)    Vitamin D deficiency    Hx of colonic polyps     Past Medical History:   Diagnosis Date    Bleeding ulcer     Breast cyst     13 years old

## 2024-04-17 NOTE — NURSING NOTE
Dior Conde's goals for this visit include:   Chief Complaint   Patient presents with    Skin Check     6 month skin check, hx NMSC. Right arm has a skin tag that she wants removed. Right shoulder- mid upper back.       She requests these members of her care team be copied on today's visit information:     PCP: Tyler Brothers    Referring Provider:  Tyler Brothers  NO INFO AVAILABLE 7/26/23    There were no vitals taken for this visit.    Do you need any medication refills at today's visit?     Tish Jordan LPN on 4/17/2024 at 1:01 PM

## 2024-04-17 NOTE — PATIENT INSTRUCTIONS
Patient Education       Proper skin care from [facility]:    -Eliminate harsh soaps as they strip the natural oils from the skin, often resulting in dry itchy skin ( i.e. Dial, Zest, Maltese Spring)  -Use mild soaps such as Cetaphil or Dove Sensitive Skin in the shower. You do not need to use soap on arms, legs, and trunk every time you shower unless visibly soiled.   -Avoid hot or cold showers.  -After showering, lightly dry off and apply moisturizing within 2-3 minutes. This will help trap moisture in the skin.   -Aggressive use of a moisturizer at least 1-2 times a day to the entire body (including -Vanicream, Cetaphil, Aquaphor or Cerave) and moisturize hands after every washing.  -We recommend using moisturizers that come in a tub that needs to be scooped out, not a pump. This has more of an oil base. It will hold moisture in your skin much better than a water base moisturizer. The above recommended are non-pore clogging.      Wear a sunscreen with at least SPF 30 on your face, ears, neck and V of the chest daily. Wear sunscreen on other areas of the body if those areas are exposed to the sun throughout the day. Sunscreens can contain physical and/or chemical blockers. Physical blockers are less likely to clog pores, these include zinc oxide and titanium dioxide. Reapply every two hour and after swimming.     Sunscreen examples: https://www.ewg.org/sunscreen/    UV radiation  UVA radiation remains constant throughout the day and throughout the year. It is a longer wavelength than UVB and therefore penetrates deeper into the skin leading to immediate and delayed tanning, photoaging, and skin cancer. 70-80% of UVA and UVB radiation occurs between the hours of 10am-2pm.  UVB radiation  UVB radiation causes the most harmful effects and is more significant during the summer months. However, snow and ice can reflect UVB radiation leading to skin damage during the winter months as well. UVB radiation is  responsible for tanning, burning, inflammation, delayed erythema (pinkness), pigmentation (brown spots), and skin cancer.     I recommend self monthly full body exams and yearly full body exams with a dermatology provider. If you develop a new or changing lesion please follow up for examination. Most skin cancers are pink and scaly or pink and pearly. However, we do see blue/brown/black skin cancers.  Consider the ABCDEs of melanoma when giving yourself your monthly full body exam ( don't forget the groin, buttocks, feet, toes, etc). A-asymmetry, B-borders, C-color, D-diameter, E-elevation or evolving. If you see any of these changes please follow up in clinic. If you cannot see your back I recommend purchasing a hand held mirror to use with a larger wall mirror.       Checking for Skin Cancer  You can find cancer early by checking your skin each month. There are 3 kinds of skin cancer. They are melanoma, basal cell carcinoma, and squamous cell carcinoma. Doing monthly skin checks is the best way to find new marks or skin changes. Follow the instructions below for checking your skin.   The ABCDEs of checking moles for melanoma   Check your moles or growths for signs of melanoma using ABCDE:   Asymmetry: the sides of the mole or growth don t match  Border: the edges are ragged, notched, or blurred  Color: the color within the mole or growth varies  Diameter: the mole or growth is larger than 6 mm (size of a pencil eraser)  Evolving: the size, shape, or color of the mole or growth is changing (evolving is not shown in the images below)    Checking for other types of skin cancer  Basal cell carcinoma or squamous cell carcinoma have symptoms such as:     A spot or mole that looks different from all other marks on your skin  Changes in how an area feels, such as itching, tenderness, or pain  Changes in the skin's surface, such as oozing, bleeding, or scaliness  A sore that does not heal  New swelling or redness beyond  the border of a mole    Who s at risk?  Anyone can get skin cancer. But you are at greater risk if you have:   Fair skin, light-colored hair, or light-colored eyes  Many moles or abnormal moles on your skin  A history of sunburns from sunlight or tanning beds  A family history of skin cancer  A history of exposure to radiation or chemicals  A weakened immune system  If you have had skin cancer in the past, you are at risk for recurring skin cancer.   How to check your skin  Do your monthly skin checkups in front of a full-length mirror. Check all parts of your body, including your:   Head (ears, face, neck, and scalp)  Torso (front, back, and sides)  Arms (tops, undersides, upper, and lower armpits)  Hands (palms, backs, and fingers, including under the nails)  Buttocks and genitals  Legs (front, back, and sides)  Feet (tops, soles, toes, including under the nails, and between toes)  If you have a lot of moles, take digital photos of them each month. Make sure to take photos both up close and from a distance. These can help you see if any moles change over time.   Most skin changes are not cancer. But if you see any changes in your skin, call your doctor right away. Only he or she can diagnose a problem. If you have skin cancer, seeing your doctor can be the first step toward getting the treatment that could save your life.   [employer] last reviewed this educational content on 4/1/2019 2000-2020 [employer]. [address] [place] [address], [place] [address]. All rights reserved. This information is not intended as a substitute for professional medical care. Always follow your healthcare professional's instructions.     Wound Care After a Biopsy    What is a skin biopsy?  A skin biopsy allows the doctor to examine a very small piece of tissue under the microscope to determine the diagnosis and the best treatment for the skin condition. A local anesthetic (numbing medicine)  is injected with a very small  needle into the skin area to be tested. A small piece of skin is taken from the area. Sometimes a suture (stitch) is used.     What are the risks of a skin biopsy?  I will experience scar, bleeding, swelling, pain, crusting and redness. I may experience incomplete removal or recurrence. Risks of this procedure are excessive bleeding, bruising, infection, nerve damage, numbness, thick (hypertrophic or keloidal) scar and non-diagnostic biopsy.    How should I care for my wound for the first 24 hours?  Keep the wound dry and covered for 24 hours  If it bleeds, hold direct pressure on the area for 15 minutes. If bleeding does not stop then go to the emergency room  Avoid strenuous exercise the first 1-2 days or as your doctor instructs you    How should I care for the wound after 24 hours?  After 24 hours, remove the bandage  You may bathe or shower as normal  If you had a scalp biopsy, you can shampoo as usual and can use shower water to clean the biopsy site daily  Clean the wound twice a day with gentle soap and water  Do not scrub, be gentle  Apply white petroleum/Vaseline after cleaning the wound with a cotton swab or a clean finger, and keep the site covered with a Bandaid /bandage. Bandages are not necessary with a scalp biopsy  If you are unable to cover the site with a Bandaid /bandage, re-apply ointment 2-3 times a day to keep the site moist. Moisture will help with healing  Avoid strenuous activity for first 1-2 days  Avoid lakes, rivers, pools, and oceans until the stitches are removed or the site is healed    How do I clean my wound?  Wash hands thoroughly with soap or use hand  before all wound care  Clean the wound with gentle soap and water  Apply white petroleum/Vaseline  to wound after it is clean  Replace the Bandaid /bandage to keep the wound covered for the first few days or as instructed by your doctor  If you had a scalp biopsy, warm shower water to the area on a daily basis should  suffice    What should I use to clean my wound?   Cotton-tipped applicators (Qtips )  White petroleum jelly (Vaseline ). Use a clean new container and use Q-tips to apply.  Bandaids   as needed  Gentle soap     How should I care for my wound long term?  Do not get your wound dirty  Keep up with wound care for one week or until the area is healed.  A small scab will form and fall off by itself when the area is completely healed. The area will be red and will become pink in color as it heals. Sun protection is very important for how your scar will turn out. Sunscreen with an SPF 30 or greater is recommended once the area is healed.  If you have stitches, stitches need to be removed in 10 days. You may return to our clinic for this or you may have it done locally at your doctor s office.  You should have some soreness but it should be mild and slowly go away over several days. Talk to your doctor about using tylenol for pain,    When should I call my doctor?  If you have increased:   Pain or swelling  Pus or drainage (clear or slightly yellow drainage is ok)  Temperature over 100F  Spreading redness or warmth around wound    When will I hear about my results?  The biopsy results can take 2-3 weeks to come back. The clinic will call you with the results, send you a [employer] message, or have you schedule a follow-up clinic or phone time to discuss the results. Contact our clinics if you do not hear from us in 3 weeks.     Who should I call with questions?  [facility]: [phone]   [facility]: [phone]  For urgent needs outside of business hours call the [facility] at [phone] and ask for the dermatology resident on call

## 2024-08-14 DIAGNOSIS — J43.2 CENTRILOBULAR EMPHYSEMA (H): ICD-10-CM

## 2024-08-14 RX ORDER — FLUTICASONE FUROATE, UMECLIDINIUM BROMIDE AND VILANTEROL TRIFENATATE 100; 62.5; 25 UG/1; UG/1; UG/1
1 POWDER RESPIRATORY (INHALATION) DAILY
Qty: 3 EACH | Refills: 3 | Status: SHIPPED | OUTPATIENT
Start: 2024-08-14

## 2024-08-14 NOTE — TELEPHONE ENCOUNTER
Patient has completed [email or web address] patient assistance program application for Fluticasone-Umeclidin-Vilant (TRELEGY ELLIPTA) 100-62.5-25 MCG/ACT oral inhaler. Patient facesheet, completed application, and written Rx for Trelegy has been faxed to [email or web address] to F: [phone].    Franklin Flores LPN  Pulmonary Medicine:  [facility]  Phone: [phone] Fax: [phone]

## 2024-10-21 ENCOUNTER — OFFICE VISIT (OUTPATIENT)
Dept: PULMONOLOGY | Facility: CLINIC | Age: 79
End: 2024-10-21
Payer: COMMERCIAL

## 2024-10-21 VITALS
OXYGEN SATURATION: 95 % | SYSTOLIC BLOOD PRESSURE: 119 MMHG | BODY MASS INDEX: 29.87 KG/M2 | DIASTOLIC BLOOD PRESSURE: 68 MMHG | WEIGHT: 158 LBS | HEART RATE: 72 BPM

## 2024-10-21 DIAGNOSIS — B37.0 THRUSH: Primary | ICD-10-CM

## 2024-10-21 PROCEDURE — 99215 OFFICE O/P EST HI 40 MIN: CPT | Performed by: INTERNAL MEDICINE

## 2024-10-21 PROCEDURE — G2211 COMPLEX E/M VISIT ADD ON: HCPCS | Performed by: INTERNAL MEDICINE

## 2024-10-21 RX ORDER — NYSTATIN 100000 [USP'U]/ML
500000 SUSPENSION ORAL 4 TIMES DAILY
Qty: 140 ML | Refills: 0 | Status: SHIPPED | OUTPATIENT
Start: 2024-10-21 | End: 2024-10-28

## 2024-10-21 ASSESSMENT — PAIN SCALES - GENERAL: PAINLEVEL: NO PAIN (1)

## 2024-10-21 NOTE — PROGRESS NOTES
Pulmonary Clinic Return Patient Visit  Reason for Visit: COPD  History of Present Illness  Dior Conde is a pleasant 79-year-old female who presents to clinic for follow up of COPD and is accompanied by her daughter Kati who works here at [place]. I last saw her in clinic in 3/2024.  To briefly review, Dior was diagnosed with COPD several years ago.  She was being seen by an outside physician and her COPD is managed with Anoro.  I escalated her regimen to triple inhaler- Trelegy with some improved control however she was unable to tolerate it due to thrush. She did notice improved symptoms with Trelegy compared to Anoro. More lately, she has been able to tolerate Trelegy with excellent rinsing after usage.  Today, she is doing better and is less symptomatic. Trelegy has been very helpful and she has less reliant on albuterol. She also recovered well since she fell and had a dislocated shoulder.   She denies any cough, wheezing, chest pain nor pedal swellings.  Former smoker, quit , 1ppd x 40yrs. She worked as a  for several years and she is retired. 2 uncles  from lung cancer in their 50's and has another uncle with copd.    Review of Systems:  10 of 14 systems reviewed and are negative unless otherwise stated in HPI.    Past Medical History:   Diagnosis Date    Bleeding ulcer     Breast cyst     13 years old       Past Surgical History:   Procedure Laterality Date    HERNIA REPAIR         No family history on file.    Social History     Socioeconomic History    Marital status:      Spouse name: None    Number of children: None    Years of education: None    Highest education level: None   Occupational History    None   Tobacco Use    Smoking status: Former Smoker     Packs/day: 1.00     Years: 40.00     Pack years: 40.00     Types: Cigarettes     Quit date: 2000     Years since quittin.4    Smokeless tobacco: Never Used   Substance and Sexual Activity    Alcohol use: No     Drug use: No    Sexual activity: None   Other Topics Concern    None   Social History Narrative    None     Social Determinants of Health     Financial Resource Strain: Not on file   Food Insecurity: Not on file   Transportation Needs: Not on file   Physical Activity: Not on file   Stress: Not on file   Social Connections: Not on file   Intimate Partner Violence: Not on file   Housing Stability: Not on file         Allergies   Allergen Reactions    Nkda [No Known Drug Allergy]          Current Outpatient Medications:     albuterol (PROAIR HFA/PROVENTIL HFA/VENTOLIN HFA) 108 (90 Base) MCG/ACT inhaler, Inhale 2 puffs into the lungs 4 times daily, Disp: 54 g, Rfl: 3    albuterol (PROVENTIL) (2.5 MG/3ML) 0.083% neb solution, Take 1 vial (2.5 mg) by nebulization every 6 hours as needed for shortness of breath / dyspnea or wheezing, Disp: 90 mL, Rfl: 11    atorvastatin (LIPITOR) 20 MG tablet, , Disp: , Rfl:     buPROPion (WELLBUTRIN XL) 150 MG 24 hr tablet, Take 1 tablet by mouth daily, Disp: , Rfl:     citalopram (CELEXA) 20 MG tablet, Take 20 mg by mouth daily, Disp: , Rfl:     Fluticasone-Umeclidin-Vilant (TRELEGY ELLIPTA) 100-62.5-25 MCG/ACT oral inhaler, Inhale 1 puff into the lungs daily, Disp: 3 each, Rfl: 3    omeprazole (PRILOSEC) 20 MG DR capsule, , Disp: , Rfl:     donepezil (ARICEPT) 5 MG tablet, Take 1 tablet by mouth daily (Patient not taking: Reported on 10/21/2024), Disp: , Rfl:     umeclidinium-vilanterol (ANORO ELLIPTA) 62.5-25 MCG/ACT oral inhaler, Inhale 1 puff into the lungs daily (Patient not taking: Reported on 10/21/2024), Disp: 3 each, Rfl: 2      Physical Exam:  /68   Pulse 72   Wt 71.7 kg (158 lb)   SpO2 95%   BMI 29.87 kg/m    GENERAL: Well developed, well nourished, alert, and in no apparent distress.  HEENT: Normocephalic, atraumatic. PERRL, EOMI. Oral mucosa is moist. No perioral cyanosis.  NECK: supple, no masses, no thyromegaly.  RESP:  Normal respiratory effort.  CTAB.  No  "rales, wheezes, rhonchi.  No cyanosis or clubbing.  CV: Normal S1, S2, regular rhythm, normal rate. No murmur.  No LE edema.   ABDOMEN:  Soft, non-tender, non-distended.   SKIN: warm and dry. No rash.  NEURO: AAOx3.  Normal gait.  Fluent speech.  PSYCH: mentation appears normal.     Results:  PFTs: Reviewed and discussed with patient-mild obstruction with significant hyperinflation and air trapping.  Relatively preserved diffusion capacity  Most Recent [facility] Pulmonary Function Testing    FVC-Pred   Date Value Ref Range Status   09/14/2022 2.33 L      FVC-Pre   Date Value Ref Range Status   09/14/2022 2.02 L      FVC-%Pred-Pre   Date Value Ref Range Status   09/14/2022 86 %      FEV1-Pre   Date Value Ref Range Status   09/14/2022 1.12 L      FEV1-%Pred-Pre   Date Value Ref Range Status   09/14/2022 62 %      FEV1FVC-Pred   Date Value Ref Range Status   09/14/2022 78 %      FEV1FVC-Pre   Date Value Ref Range Status   09/14/2022 56 %      No results found for: \"20029\"  FEFMax-Pred   Date Value Ref Range Status   09/14/2022 4.53 L/sec      FEFMax-Pre   Date Value Ref Range Status   09/14/2022 3.76 L/sec      FEFMax-%Pred-Pre   Date Value Ref Range Status   09/14/2022 83 %      ExpTime-Pre   Date Value Ref Range Status   09/14/2022 10.94 sec      FIFMax-Pre   Date Value Ref Range Status   09/14/2022 4.03 L/sec      FEV1FEV6-Pred   Date Value Ref Range Status   09/14/2022 78 %      FEV1FEV6-Pre   Date Value Ref Range Status   09/14/2022 58 %      No results found for: \"20055\"  Imaging (personally reviewed in clinic today): CXR 3/10/2022  Impression:   1. No focal airspace disease.  2. Stable appearing high lung volumes with flattening of the hemidiaphragms which can be seen in patients with chronic obstructive pulmonary disease.  3. Stable appearing streaky bibasilar opacities which may represent atelectasis versus scarring.    Assessment and Plan:   COPD- Group B  Significant improvement and good control of COPD on " current regimen of low dose Trelegy with CAT score of 11. She will continue to rinse her mouth after each use to prevent thrush which has been an issues in the past. She does have a standing order for nystatin to use as needed. I have referred her to pulmonary rehab to help with deconditioning. I also encouraged her to use her albuterol prior to chores at home.       Questions and concerns were answered to the patient's satisfaction.  she was provided with my contact information should new questions or concerns arise in the interim.  she should return to clinic in 6 months   Up to date on vaccination    I spent 40 minutes on the date of the encounter doing chart review, history and exam, documentation and further coordination as noted above exclusive of time interpreting PFT, Chest Xray, CT Chest.     Donna Webb MD  Pulmonary, Critical Care and Sleep Medicine  [facility]  Pager: [phone]      The above note was dictated using voice recognition software and may include typographical errors. Please contact the author for any clarifications.

## 2025-01-25 ENCOUNTER — HEALTH MAINTENANCE LETTER (OUTPATIENT)
Age: 80
End: 2025-01-25

## 2025-02-03 DIAGNOSIS — J43.2 CENTRILOBULAR EMPHYSEMA (H): ICD-10-CM

## 2025-02-03 RX ORDER — FLUTICASONE FUROATE, UMECLIDINIUM BROMIDE AND VILANTEROL TRIFENATATE 100; 62.5; 25 UG/1; UG/1; UG/1
1 POWDER RESPIRATORY (INHALATION) DAILY
Qty: 3 EACH | Refills: 3 | Status: CANCELLED | OUTPATIENT
Start: 2025-02-03

## 2025-02-03 NOTE — PROGRESS NOTES
Fluticasone-Umeclidin-Vilant (TRELEGY ELLIPTA) 100-62.5-25 MCG/ACT oral inhaler Inhale 1 puff into the lungs daily 3 each 3 ordered 08/14/2024     Last office visit: 10/21/2024 ; last virtual visit: Visit date not found with prescribing provider:   Future Office Visit:  4/23/25    Josef Lim RN on 2/3/2025 at 2:07 PM

## 2025-02-03 NOTE — TELEPHONE ENCOUNTER
Fluticasone-Umeclidin-Vilant (TRELEGY ELLIPTA) 100-62.5-25 MCG/ACT oral inhaler Inhale 1 puff into the lungs daily 3 each 3 ordered 08/14/2024     Last office visit: 10/21/2024 ; last virtual visit: Visit date not found with prescribing provider:     Future Office Visit:  4/23/25    Josef Lim RN on 2/3/2025 at 2:12 PM

## 2025-02-04 RX ORDER — FLUTICASONE FUROATE, UMECLIDINIUM BROMIDE AND VILANTEROL TRIFENATATE 100; 62.5; 25 UG/1; UG/1; UG/1
1 POWDER RESPIRATORY (INHALATION) DAILY
Qty: 3 EACH | Refills: 3 | Status: SHIPPED | OUTPATIENT
Start: 2025-02-04